# Patient Record
Sex: FEMALE | Race: WHITE | Employment: FULL TIME | URBAN - METROPOLITAN AREA
[De-identification: names, ages, dates, MRNs, and addresses within clinical notes are randomized per-mention and may not be internally consistent; named-entity substitution may affect disease eponyms.]

---

## 2019-02-04 ENCOUNTER — HOSPITAL ENCOUNTER (OUTPATIENT)
Dept: DIABETES SERVICES | Age: 59
Discharge: HOME | End: 2019-02-04
Payer: COMMERCIAL

## 2019-02-04 DIAGNOSIS — E10.9 TYPE 1 DIABETES MELLITUS WITHOUT COMPLICATION (HCC): ICD-10-CM

## 2019-02-04 PROCEDURE — G0108 DIAB MANAGE TRN  PER INDIV: HCPCS | Performed by: DIETITIAN, REGISTERED

## 2020-01-27 ENCOUNTER — HOSPITAL ENCOUNTER (OUTPATIENT)
Dept: GENERAL RADIOLOGY | Age: 60
Discharge: HOME | End: 2020-01-27
Attending: INTERNAL MEDICINE
Payer: COMMERCIAL

## 2020-01-27 DIAGNOSIS — R05.9 COUGH: ICD-10-CM

## 2020-01-27 PROCEDURE — 71046 X-RAY EXAM CHEST 2 VIEWS: CPT

## 2020-04-22 ENCOUNTER — VIRTUAL VISIT (OUTPATIENT)
Dept: ENDOCRINOLOGY | Age: 60
End: 2020-04-22

## 2020-04-22 ENCOUNTER — TELEPHONE (OUTPATIENT)
Dept: ENDOCRINOLOGY | Age: 60
End: 2020-04-22

## 2020-04-22 DIAGNOSIS — E78.2 MIXED HYPERLIPIDEMIA: ICD-10-CM

## 2020-04-22 DIAGNOSIS — E11.21 TYPE 2 DIABETES MELLITUS WITH DIABETIC NEPHROPATHY, WITH LONG-TERM CURRENT USE OF INSULIN (HCC): Primary | ICD-10-CM

## 2020-04-22 DIAGNOSIS — Z79.4 TYPE 2 DIABETES MELLITUS WITH DIABETIC NEPHROPATHY, WITH LONG-TERM CURRENT USE OF INSULIN (HCC): Primary | ICD-10-CM

## 2020-04-22 RX ORDER — SODIUM ZIRCONIUM CYCLOSILICATE 5 G/5G
5 POWDER, FOR SUSPENSION ORAL
COMMUNITY
End: 2020-08-28

## 2020-04-22 RX ORDER — DILTIAZEM HYDROCHLORIDE 120 MG/1
CAPSULE, EXTENDED RELEASE ORAL DAILY
COMMUNITY
Start: 2020-04-17 | End: 2020-08-28

## 2020-04-22 RX ORDER — INSULIN ASPART INJECTION 100 [IU]/ML
INJECTION, SOLUTION SUBCUTANEOUS
COMMUNITY
Start: 2020-04-17 | End: 2020-06-29 | Stop reason: SDUPTHER

## 2020-04-22 RX ORDER — ATORVASTATIN CALCIUM 20 MG/1
TABLET, FILM COATED ORAL DAILY
COMMUNITY
Start: 2020-04-17 | End: 2021-11-08 | Stop reason: SDUPTHER

## 2020-04-22 RX ORDER — VALACYCLOVIR HYDROCHLORIDE 500 MG/1
500 TABLET, FILM COATED ORAL DAILY
COMMUNITY
Start: 2020-04-17

## 2020-04-22 NOTE — PROGRESS NOTES
Chief Complaint   Patient presents with    Diabetes     pcp and pharmacy verified            **THIS IS A VIRTUAL VISIT VIA A VIDEO ENCOUNTER. PATIENT AGREED TO HAVE THEIR CARE DELIVERED OVER VIDEO IN PLACE OF THEIR REGULARLY SCHEDULED OFFICE VISIT**      History of Present Illness: Roney Cazares is a 61 y.o. female who I was asked to see in consult by Dr. Rita Segura for evaluation of DM. Will request records from Dr. Antonio Rosen. She was diagnosed with diabetes with her first pregnancy and then \"it went away\". She had GDM with her second pregnancy and it continued after that. She is currently using an OMNI pod (which she has been on for 2 years) as well as the DexCom G6. She does upload her CGM data to the [facility]. Her current Pump settings  MN 0.6  4AM 0.45  9AM 0.5  11AM 0.55  8PM 0.6 units  Carb Ratio  1:10  Correction Factor  1:30    Pt reports that she is tending to run high in the evening and is having lower sugars in the morning. She notes that she is very active and she has been cutting off the omnipod before she works out, goes walking or plays tennis. She notes that her BGs are running high around 2-3AM (300's range), but if she corrects this her BGs will drop to low by 5AM.  If she does not correct then her morning BG will be around 170-200, then she goes for a run and it will drop. She goes for her AM run around 930-10AM. She was going to the gym, but she can no longer go there. Her run will typically last for 30-45 minutes. She is only eating one meal per day and is eating a light lunch and will snack some during the day  Pt wakes around 830AM, she will not eat before she goes for her run. She will have a lunch around 11AM-Noon. If she is playing tennis she will keep an apple with her and watch her BGs and eat if she gets low. This afternoon she had some leftover chicken and diet soda.   She has dinner around 730-8PM, last night she had fish, spinach, salad and wine.    Exercise consists of running, playing tennis. No history of vascular disease. Pt has nephropathy (Urine MA/Cr 890 in December 2019) as well as CKD III (eGFR 40's). No history of neuropathy. Last eye exam was March 2020, she sees him every 6 weeks. She has swelling in her retina and is receiving injections for this. She reports that she has never been told she has retinopathy, bleeding in her retina or \"diabetes in her eyes\". Past Medical History:   Diagnosis Date    Diabetes [facility])      History reviewed. No pertinent surgical history. Current Outpatient Medications   Medication Sig    dilTIAZem (TIAZAC) 120 mg SR capsule daily.  Fiasp U-100 Insulin 100 unit/mL soln USES IN PUMP    atorvastatin (LIPITOR) 20 mg tablet daily.  valACYclovir (VALTREX) 500 mg tablet 500 mg daily.  sodium zirconium cyclosilicate (Lokelma) 5 gram powder packet Take 5 g by mouth. 1 packet every other day     No current facility-administered medications for this visit.       No Known Allergies  Family History   Problem Relation Age of Onset    Cancer Mother         Brain Tumor    Diabetes Father     Heart Disease Father     No Known Problems Sister     Cancer Sister         Brain tumor at 11     Social History     Socioeconomic History    Marital status:      Spouse name: Not on file    Number of children: Not on file    Years of education: Not on file    Highest education level: Not on file   Occupational History    Not on file   Social Needs    Financial resource strain: Not on file    Food insecurity     Worry: Not on file     Inability: Not on file   [employer] needs     Medical: Not on file     Non-medical: Not on file   Tobacco Use    Smoking status: Never Smoker    Smokeless tobacco: Never Used   Substance and Sexual Activity    Alcohol use: Yes     Comment: socially    Drug use: Never    Sexual activity: Not on file   Lifestyle    Physical activity     Days per week: Not on file     Minutes per session: Not on file    Stress: Not on file   Relationships    Social connections     Talks on phone: Not on file     Gets together: Not on file     Attends Judaism service: Not on file     Active member of club or organization: Not on file     Attends meetings of clubs or organizations: Not on file     Relationship status: Not on file    Intimate partner violence     Fear of current or ex partner: Not on file     Emotionally abused: Not on file     Physically abused: Not on file     Forced sexual activity: Not on file   Other Topics Concern    Not on file   Social History Narrative    Not on file     Review of Systems:  - Constitutional Symptoms: no fevers, chills, weight loss  - Eyes: no blurry vision or double vision  - Cardiovascular: no chest pain or palpitations  - Respiratory: no cough or shortness of breath  - Gastrointestinal: no dysphagia or abdominal pain  - Musculoskeletal: no joint pains or weakness  - Integumentary: no rashes  - Neurological: no numbness, tingling, or headaches  - Psychiatric: no depression or anxiety  - Endocrine: no heat or cold intolerance, no polyuria or polydipsia    Physical Examination:  There were no vitals taken for this visit.   - GENERAL: NCAT, Appears well nourished   - EYES: EOMI, non-icteric, no proptosis   - Ear/Nose/Throat: NCAT, no visible inflammation or masses   - CARDIOVASCULAR: no cyanosis, no visible JVD   - RESPIRATORY: respiratory effort normal without any distress or labored breathing   - MUSCULOSKELETAL: Normal ROM of neck and upper extremities observed   - SKIN: No rash on face   - NEUROLOGIC:  No facial asymmetry (Cranial nerve 7 motor function), No gaze palsy   - PSYCHIATRIC: Normal affect, Normal insight and judgement   -     Data Reviewed:        Date Name Specimen Result Interpretation Description Value Range Status Address     12/05/2019 CMP, Serum or Plasma   Above High Normal   Glucose 221 mg/dL 65-99 mg/dL Final [facility] ([place]): [address] [facility] [place]          Above High Normal   Bun 32 mg/dL 6-24 mg/dL Final [facility] ([place]): [address] [facility] [place]          Above High Normal   Creatinine 1.38 mg/dL 0.57-1.00 mg/dL Final [facility] ([place]): [address] [facility] [place]          Below Low Normal   eGFR If Nonafricn AM 42 mL/min/1.73 >59 mL/min/1.73 Final [facility] ([place]): [address] [facility] [place]          Below Low Normal   eGFR If Africn AM 48 mL/min/1.73 >59 mL/min/1.73 Final [facility] ([place]): [address] [facility] [place]              BUN/creatinine Ratio 23 9-23 Final [facility] ([place]): [address] [facility] [place]              Sodium 141 mmol/L 134-144 mmol/L Final [facility] ([place]): [address] [facility] [place]              Potassium 5.2 mmol/L 3.5-5.2 mmol/L Final [facility] ([place]): [address] [facility] [place]              Chloride 102 mmol/L  mmol/L Final [facility] ([place]): [address] [facility] [place]              Carbon Dioxide, Total 23 mmol/L 20-29 mmol/L Final [facility] ([place]): [address] [facility] [place]          Above High Normal   Calcium 10.3 mg/dL 8.7-10.2 mg/dL Final [facility] ([place]): [address] [facility] [place]              Protein, Total 7.1 g/dL 6.0-8.5 g/dL Final [facility] ([place]): [address] [facility] [place]              Albumin 4.4 g/dL 3.5-5.5 g/dL Final [facility] ([place]): [address] [facility] [place]              Globulin, Total 2.7 g/dL 1.5-4.5 g/dL Final [facility] ([place]): [address] [facility] [place]              A/g Ratio 1.6 1.2-2.2 Final [facility] ([place]): [address] [facility]              Bilirubin, Total <0.2 mg/dL 0.0-1.2 mg/dL Final [facility] ([place]): [address] [facility] [place]              Alkaline Phosphatase 64 IU/L  IU/L Final [facility] ([place]): [address] [facility] [place]              Ast (Sgot) 20 IU/L 0-40 IU/L Final [facility] ([place]): [address] [facility] [place]              Alt (Sgpt) 15 IU/L 0-32 IU/L Final [facility] ([place]): [address] [facility] [place]    12/05/2019 Lipid Panel, Serum   Above High Normal   Cholesterol, Total 271 mg/dL 100-199 mg/dL Final [facility] ([place]): [address] [facility]              Triglycerides 65 mg/dL 0-149 mg/dL Final [facility] ([place]): [address]              HDL Cholesterol 138 mg/dL >39 mg/dL Final [facility] ([place]): [address] [facility]              VLDL Cholesterol [place] 13 mg/dL 5-40 mg/dL Final [facility] ([place]): [address] [facility]          Above High Normal   LDL Cholesterol Calc 120 mg/dL 0-99 mg/dL Final [facility] ([place]): [address] [facility]              LDL/HDL Ratio 0.9 ratio 0.0-3.2 ratio Final [facility] ([place]): [address]    12/05/2019 Microalbumin/creatinine, Mass Ratio, Urine       Creatinine, Urine 228.9 mg/dL not estab. mg/dL Final [facility] ([place]): [address] [facility]              Albumin, Urine 2038.0 ug/mL not estab. ug/mL Final [facility] ([place]): [address] [facility]          Above High Normal   Alb/creat Ratio 890.3 mg/g creat 0.0-30.0 mg/g creat Final [facility] ([place]): [address] [facility]    12/05/2019 HbA1C (Hemoglobin a1C), Blood   Above High Normal   Hemoglobin a1C 10.1 % 4.8-5.6 % Final [facility] ([place]): [address] [facility]    12/05/2019 TSH W/reflex       Tsh 1.490 uIU/mL 0.450-4.500 uIU/mL Final [facility] ([facility]): [address]          Assessment/Plan:   1. Type 2 diabetes mellitus with diabetic nephropathy, with long-term current use of insulin ([address]    2. Mixed hyperlipidemia    1) Pt is reporting that she has high BG spikes at 2-3AM and if she does nothing it will improve to 170-200's range by the time she wakes around 8AM, but when she goes for a run or plays tennis in the morning her BGs will drop.   We adjusted her Basal rate on the OMNI Pod to the following  MN 0.6 > 0.7  4AM 0.45  9AM 0.5 > 0.45  11AM 0.55  8PM 0.6 units    Pt to share her DexCom clarity data with me and we can monitor her BGs to see if her numbers are improving and if not, we can adjust her pump setting further. Will order an A1C.    2) In December 2019 her TC and LDL were above goal, she is now taking Atorvastatin 20mg daily. We can repeat her lipid panel before our next visit to see if this dose needs to be adjusted. Pt voices understanding and agreement with the plan.     RTC 3 months          Copy sent to:  Dr. Kenneth Villalta

## 2020-04-22 NOTE — TELEPHONE ENCOUNTER
----- Message from Sara Morales sent at 4/21/2020  4:42 PM EDT -----  Regarding: Dr. Jagdish Joe  Patient return call    Caller's first and last name and relationship (if not the patient):      Best contact number(s):  [phone]      Whose call is being returned: [facility]      Details to clarify the request: Returning call to reschedule virtual appointment on 4/22, no appointments available    Sara Morales

## 2020-06-09 LAB
CREATININE, EXTERNAL: 1.22
HBA1C MFR BLD HPLC: 9.3 %
LDL-C, EXTERNAL: 106

## 2020-07-21 ENCOUNTER — DOCUMENTATION ONLY (OUTPATIENT)
Dept: ENDOCRINOLOGY | Age: 60
End: 2020-07-21

## 2020-07-24 ENCOUNTER — TELEPHONE (OUTPATIENT)
Dept: ENDOCRINOLOGY | Age: 60
End: 2020-07-24

## 2020-07-24 NOTE — TELEPHONE ENCOUNTER
----- Message from Kyle Galeana sent at 7/24/2020  3:46 PM EDT -----  Regarding: Dr Hanks Getting  General Message/Vendor Calls    Caller's first and last name: Prema/Rajani BC/BS      Reason for call: Krista Alcantar is requesting clinical information to support the excessive qty which is above the quantity limits allowed    (F) [phone]    Callback required yes/no and why: yes      Best contact number(s):[phone]      Details to clarify the request:      Kyle Galeana

## 2020-08-27 LAB
EST. AVERAGE GLUCOSE BLD GHB EST-MCNC: 226 MG/DL
HBA1C MFR BLD: 9.5 % (ref 4.8–5.6)

## 2020-08-28 ENCOUNTER — VIRTUAL VISIT (OUTPATIENT)
Dept: ENDOCRINOLOGY | Age: 60
End: 2020-08-28
Payer: COMMERCIAL

## 2020-08-28 DIAGNOSIS — E11.21 TYPE 2 DIABETES MELLITUS WITH DIABETIC NEPHROPATHY, WITH LONG-TERM CURRENT USE OF INSULIN (HCC): Primary | ICD-10-CM

## 2020-08-28 DIAGNOSIS — E78.2 MIXED HYPERLIPIDEMIA: ICD-10-CM

## 2020-08-28 DIAGNOSIS — Z79.4 TYPE 2 DIABETES MELLITUS WITH DIABETIC NEPHROPATHY, WITH LONG-TERM CURRENT USE OF INSULIN (HCC): Primary | ICD-10-CM

## 2020-08-28 PROCEDURE — 99214 OFFICE O/P EST MOD 30 MIN: CPT | Performed by: INTERNAL MEDICINE

## 2020-08-28 RX ORDER — SULFAMETHOXAZOLE AND TRIMETHOPRIM 800; 160 MG/1; MG/1
TABLET ORAL
COMMUNITY
End: 2020-08-28 | Stop reason: ALTCHOICE

## 2020-08-28 RX ORDER — SODIUM POLYSTYRENE SULFONATE 15 G/60ML
SUSPENSION ORAL; RECTAL
COMMUNITY
Start: 2020-06-20 | End: 2020-08-28

## 2020-08-28 RX ORDER — PATIROMER 16.8 G/1
POWDER, FOR SUSPENSION ORAL EVERY OTHER DAY
COMMUNITY
End: 2021-07-14

## 2020-08-28 NOTE — PROGRESS NOTES
Lab Results   Component Value Date/Time    Hemoglobin A1c 9.5 (H) 08/26/2020 11:37 AM    Hemoglobin A1c, External 9.3 06/09/2020    Hemoglobin A1c, External 10.1 12/06/2019

## 2020-08-28 NOTE — PROGRESS NOTES
Chief Complaint   Patient presents with    Diabetes     Doxy: [id number]     Other     Pharmacy: [facility]            **THIS IS A VIRTUAL VISIT VIA A VIDEO ENCOUNTER. PATIENT AGREED TO HAVE THEIR CARE DELIVERED OVER VIDEO IN PLACE OF THEIR REGULARLY SCHEDULED OFFICE VISIT**      History of Present Illness: Khanh Blanc is a 61 y.o. female here for follow up of diabetes. She was diagnosed with diabetes with her first pregnancy and then \"it went away\". She had GDM with her second pregnancy and it continued after that. She is currently using an OMNI pod (which she has been on for 2-3 years) as well as the DexCom G6. She does upload her CGM data to the [facility]. \"My sugars have been better since we made the changes last time, but I am still having high blood sugars overnight and in the morning. She uses the DexCom G6 to check her BGs  6-8 times per day. She notes her BGs overnight have been running in the 200-300. She note they seem to increase overnight and then start to come back down around 6AM.  \"I am active during the day and my sugars are good during the day, but around 11PM, they start to run higher\". Pt notes that if she gives herself a correction bolus in the morning she has has BGs drop as low as 40. She denies any recent illnesses, injuries or hospitalizations. Her current Pump settings  MN-4AM 0.7  4AM-11AM 0.45  11AM-8PM 0.55  8PM-MN 0.6 units  Carb Ratio  1:10  Correction Factor  1:30    Pt notes her largest meal of the day is at 8AM.  She notes dinner is her only \"meal\" during the day. She notes that she will snack or fruit on occasions during the day. Pt notes that she runs, plays tennis and will swim to stay active and busy. No history of vascular disease. Pt has nephropathy (Urine MA/Cr 890 in December 2019) as well as CKD III (eGFR 40's). No history of neuropathy. Last eye exam was March 2020, she sees him every 6 weeks.  She has swelling in her retina and is receiving injections for this. She reports that she has never been told she has retinopathy, bleeding in her retina or \"diabetes in her eyes\". Pt monitors her BPs at home and she notes that \"are running in a good range, they have never been high\". Current Outpatient Medications   Medication Sig    patiromer calcium sorbitex (Veltassa) 16.8 gram powder Veltassa 16.8 gram oral powder packet    Fiasp U-100 Insulin 100 unit/mL soln USES IN PUMP 50 units per day max    atorvastatin (LIPITOR) 20 mg tablet daily.  valACYclovir (VALTREX) 500 mg tablet 500 mg daily. No current facility-administered medications for this visit. No Known Allergies  Review of Systems:  - Eyes: no blurry vision or double vision  - Cardiovascular: no chest pain  - Respiratory: no shortness of breath  - Musculoskeletal: no myalgias  - Neurological: no numbness/tingling in extremities    Physical Examination:  There were no vitals taken for this visit. - GENERAL: NCAT, Appears well nourished   - EYES: EOMI, non-icteric, no proptosis   - Ear/Nose/Throat: NCAT, no visible inflammation or masses   - CARDIOVASCULAR: no cyanosis, no visible JVD   - RESPIRATORY: respiratory effort normal without any distress or labored breathing   - MUSCULOSKELETAL: Normal ROM of neck and upper extremities observed   - SKIN: No rash on face   - NEUROLOGIC:  No facial asymmetry (Cranial nerve 7 motor function), No gaze palsy   - PSYCHIATRIC: Normal affect, Normal insight and judgement   -     Data Reviewed:   Component      Latest Ref Rng & Units 6/9/2020 6/9/2020 6/9/2020          12:00 AM 12:00 AM 12:00 AM   LDL-C, External         106   Creatinine, External        1.22    Hemoglobin A1c, External       9.3         Assessment/Plan:   1) DM > Her A1C in June was 9.3%. She is still having high BGs overnight, but she notes her BGs during the day have been much improved. Will make the following changes to her pump setting.     Her current Pump settings  MN-4AM 0.7 > 0.8  4AM-11AM 0.45  11AM-8PM 0.55  8PM-MN 0.6 >0.7  Carb Ratio  1:10  Correction Factor  1:30    Will review her BG readings in 2 weeks and make further adjustments as needed. 2) HLD > Her LDL was 106 in June 2020. Pt is taking the Atorvastatin 20mg daily, which she is tolerating well. Will not make any changes at this time. Pt voices understanding and agreement with the plan. I spent 25 minutes of total time during this virtual visit with a tele-video encounter. All questions were answered and a copy of the instructions were sent to her via [employer]/a letter.        RTC 3 months    Copy sent to:  Dr. Ronel Mart

## 2020-09-22 ENCOUNTER — TELEPHONE (OUTPATIENT)
Dept: ENDOCRINOLOGY | Age: 60
End: 2020-09-22

## 2020-09-22 NOTE — TELEPHONE ENCOUNTER
Called patient. Left a message on her VM (name verified) that there is an unread MyChart message from Roshni Cm. Read the message regarding pump settings to VM. Advised to call to confirm receipt of message.

## 2020-10-01 ENCOUNTER — TELEPHONE (OUTPATIENT)
Dept: ENDOCRINOLOGY | Age: 60
End: 2020-10-01

## 2020-10-01 NOTE — TELEPHONE ENCOUNTER
----- Message from Yuliet Flores sent at 10/1/2020  4:08 PM EDT -----  Regarding: Dr Tanesha Laird  Patient return call    Caller's first and last name and relationship (if not the patient):      Best contact number(s): [phone]      Whose call is being returned: [employer]      Details to clarify the request: pt has received the VM and has made the changes as recommended         Yuliet Flores

## 2020-10-20 ENCOUNTER — TELEPHONE (OUTPATIENT)
Dept: ENDOCRINOLOGY | Age: 60
End: 2020-10-20

## 2020-10-20 NOTE — TELEPHONE ENCOUNTER
Spoke with patient. She states that she did see the notification that she received a AirClic message, but was not sure if she looked in the correct place to retrieve the message. When she gets home, she will open [employer] and try to see the messages. (Advised has 2 unread messages). Requested that patient write a [employer] message to make sure that we can correspond via AirClic. Left a VM with the instructions from 10/16/2020, but patient states that she is having some lows in the mornings. She states that when she sleeps, she must roll over onto the pump, causing the site to get sore and \"infected\", then the pump does not work. She wonders if there is some other pump or if she can do something differently with the pump at bedtime.

## 2020-10-20 NOTE — TELEPHONE ENCOUNTER
You 3 minutes ago (10:10 AM)          Spoke with patient. She states that she did see the notification that she received a [employer] message, but was not sure if she looked in the correct place to retrieve the message. When she gets home, she will open [employer] and try to see the messages. (Advised has 2 unread messages). Requested that patient write a [employer] message to make sure that we can correspond via [employer]. Left a VM, per patient request,  with the instructions from 10/16/2020, but patient states that she is having some lows in the mornings. She states that when she sleeps, she must roll over onto the pump, causing the site to get sore and \"infected\", then the pump does not work. She wonders if there is some other pump or if she can do something differently with the pump at bedtime. Will send a [employer] message to patient asking her to upload blood sugar readings to [facility] for Dexcom for  to see.

## 2020-11-17 ENCOUNTER — PATIENT MESSAGE (OUTPATIENT)
Dept: ENDOCRINOLOGY | Age: 60
End: 2020-11-17

## 2020-11-19 RX ORDER — INSULIN ASPART INJECTION 100 [IU]/ML
INJECTION, SOLUTION SUBCUTANEOUS
Qty: 50 ML | Refills: 3 | Status: SHIPPED | OUTPATIENT
Start: 2020-11-19 | End: 2021-11-08 | Stop reason: SDUPTHER

## 2020-12-08 ENCOUNTER — VIRTUAL VISIT (OUTPATIENT)
Dept: ENDOCRINOLOGY | Age: 60
End: 2020-12-08
Payer: COMMERCIAL

## 2020-12-08 DIAGNOSIS — E11.21 TYPE 2 DIABETES MELLITUS WITH DIABETIC NEPHROPATHY, WITH LONG-TERM CURRENT USE OF INSULIN (HCC): Primary | ICD-10-CM

## 2020-12-08 DIAGNOSIS — E78.2 MIXED HYPERLIPIDEMIA: ICD-10-CM

## 2020-12-08 DIAGNOSIS — Z79.4 TYPE 2 DIABETES MELLITUS WITH DIABETIC NEPHROPATHY, WITH LONG-TERM CURRENT USE OF INSULIN (HCC): Primary | ICD-10-CM

## 2020-12-08 PROCEDURE — 99214 OFFICE O/P EST MOD 30 MIN: CPT | Performed by: INTERNAL MEDICINE

## 2020-12-08 NOTE — PROGRESS NOTES
Chief Complaint   Patient presents with    Diabetes     pcp and pharmacy verified. DOXY. ME            **THIS IS A VIRTUAL VISIT VIA A VIDEO ENCOUNTER. PATIENT AGREED TO HAVE THEIR CARE DELIVERED OVER VIDEO IN PLACE OF THEIR REGULARLY SCHEDULED OFFICE VISIT**      History of Present Illness: Bean Major is a 61 y.o. female here for follow up of diabetes. She was diagnosed with diabetes with her first pregnancy and then \"it went away\". She had GDM with her second pregnancy and it continued after that. She is currently using an OMNI pod (which she has been on for 2-3 years) as well as the DexCom G6. She does upload her CGM data to the [facility]. Her most recent BG logs show an average BG of 219. Her sugars tend to trend up to the 300s between 8PM-MN, then starting around 2-3AM will stert the decline and then remain stable in the 180s range till noon and will increase after lunch and then start to improve around 1-2PM.    Pt notes she is not bolusing enough for her dinner, which is why she feels her BGs are increasing overnight. She is very hesitant to make any changes on her basal rate because of fears of low BGs overnight. She uses the DexCom G6 to check her BGs  6-8 times per day. \"I am active during the day and my sugars are good during the day, but around 11PM, they start to run higher\". Pt notes that if she gives herself a correction bolus in the morning she has has BGs drop as low as 40. She denies any recent illnesses, injuries or hospitalizations. Her current Pump settings  MN-4AM 0.9  4AM-11AM 0.35  11AM-8PM 0.55  8PM-MN 0.9   Carb Ratio  1:10  Correction Factor  1:30    Pt she is not eating much during the day \"I will have a small breakfast, a piece of fruit in the afternoon and then dinner\". She will wake up and go for a run or exercise.   By 10AM she will have a piece of fruit and then by 1-2PM she will have a piece of fruit and have dinner around 730PM, last night she had a bowl of chick-Pea soup and salad. She will have a bowl of ice cream around 830-9PM    Pt notes that she runs, plays tennis and will swim to stay active and busy. No history of vascular disease. Pt has nephropathy (Urine MA/Cr 890 in December 2019) as well as CKD III (eGFR 40's). No history of neuropathy. Last eye exam was November 2020, she sees him every 6-8 weeks. She has swelling in her retina and is receiving injections for this. She reports that she has never been told she has retinopathy, bleeding in her retina or \"diabetes in her eyes\". Pt monitors her BPs at home and she notes that \"are running in a good range, they have never been high\". Current Outpatient Medications   Medication Sig    Fiasp U-100 Insulin 100 unit/mL soln USES IN PUMP 50 units per day max    patiromer calcium sorbitex (Veltassa) 16.8 gram powder every other day.  atorvastatin (LIPITOR) 20 mg tablet daily.  valACYclovir (VALTREX) 500 mg tablet 500 mg daily. No current facility-administered medications for this visit. No Known Allergies  Review of Systems:  - Eyes: no blurry vision or double vision  - Cardiovascular: no chest pain  - Respiratory: no shortness of breath  - Musculoskeletal: no myalgias  - Neurological: no numbness/tingling in extremities    Physical Examination:  There were no vitals taken for this visit.   - GENERAL: NCAT, Appears well nourished   - EYES: EOMI, non-icteric, no proptosis   - Ear/Nose/Throat: NCAT, no visible inflammation or masses   - CARDIOVASCULAR: no cyanosis, no visible JVD   - RESPIRATORY: respiratory effort normal without any distress or labored breathing   - MUSCULOSKELETAL: Normal ROM of neck and upper extremities observed   - SKIN: No rash on face   - NEUROLOGIC:  No facial asymmetry (Cranial nerve 7 motor function), No gaze palsy   - PSYCHIATRIC: Normal affect, Normal insight and judgement   -     Data Reviewed:   None    Assessment/Plan:   1) DM > She is still having high BGs overnight, but she notes her BGs during the day have been much improved. Will make the following changes to her pump setting. Her current Pump settings  MN-4AM 0.9  4AM-11AM 0.35  11AM-8PM 0.55  8PM-MN 0.9 > 1.0 units per hour  Carb Ratio  1:10  Correction Factor  1:30    Because she has had extreme variations (40-400s) and hypoglycemia, she will benefit from the upgraded DASH system to help monitor her BGs and be able to correct the basal for variations. Will review her BG readings in 2 weeks and make further adjustments as needed. 2) HLD > Her LDL was 106 in June 2020. Pt is taking the Atorvastatin 20mg daily, which she is tolerating well. Will not make any changes at this time. Pt voices understanding and agreement with the plan.       RTC 4 months    Copy sent to:  Dr. Valorie Webb

## 2020-12-23 LAB — LDL-C, EXTERNAL: 117

## 2020-12-28 LAB — CREATININE, EXTERNAL: 0.96

## 2020-12-29 LAB — HBA1C MFR BLD HPLC: 9.1 %

## 2021-04-06 ENCOUNTER — OFFICE VISIT (OUTPATIENT)
Dept: ENDOCRINOLOGY | Age: 61
End: 2021-04-06
Payer: COMMERCIAL

## 2021-04-06 VITALS
RESPIRATION RATE: 14 BRPM | SYSTOLIC BLOOD PRESSURE: 140 MMHG | WEIGHT: 131 LBS | DIASTOLIC BLOOD PRESSURE: 85 MMHG | BODY MASS INDEX: 23.96 KG/M2 | HEART RATE: 68 BPM

## 2021-04-06 DIAGNOSIS — E11.21 TYPE 2 DIABETES MELLITUS WITH DIABETIC NEPHROPATHY, WITH LONG-TERM CURRENT USE OF INSULIN (HCC): Primary | ICD-10-CM

## 2021-04-06 DIAGNOSIS — E78.2 MIXED HYPERLIPIDEMIA: ICD-10-CM

## 2021-04-06 DIAGNOSIS — Z79.4 TYPE 2 DIABETES MELLITUS WITH DIABETIC NEPHROPATHY, WITH LONG-TERM CURRENT USE OF INSULIN (HCC): Primary | ICD-10-CM

## 2021-04-06 PROCEDURE — 99214 OFFICE O/P EST MOD 30 MIN: CPT | Performed by: INTERNAL MEDICINE

## 2021-04-06 RX ORDER — OFLOXACIN 3 MG/ML
SOLUTION/ DROPS OPHTHALMIC
COMMUNITY
Start: 2021-04-05 | End: 2021-04-06

## 2021-04-06 RX ORDER — PREDNISOLONE ACETATE 10 MG/ML
SUSPENSION/ DROPS OPHTHALMIC
COMMUNITY
Start: 2021-03-03 | End: 2021-07-14

## 2021-04-06 RX ORDER — KETOROLAC TROMETHAMINE 5 MG/ML
SOLUTION OPHTHALMIC
COMMUNITY
Start: 2021-03-18 | End: 2021-04-06

## 2021-04-06 NOTE — PROGRESS NOTES
Chief Complaint   Patient presents with    Diabetes     pcp and pharmacy verified. see retinal specialist. IN PERSON              History of Present Illness: Kayode Otero is a 61 y.o. female here for follow up of diabetes. She was diagnosed with diabetes with her first pregnancy and then \"it went away\". She had GDM with her second pregnancy and it continued after that. \"They sent me a new meter and walked me through the set-up but I don't now if I did it correctly. If I hit my pump wrong the pump will stop working and when my sugars start running very high that notifies me and I have to change my pump site. \"    She is currently using an OMNI pod as well as the DexCom G6. She does upload her CGM data to the [facility]. Her BGs start increasing around 9PM, are high all night and then she notes that she will get up and get active in the morning and her BGs will improve. Pt notes that if she boluses to cover a high BG overnight her sugars are prone to drop. She uses the DexCom G6 to check her BGs  6-8 times per day. \"I am active during the day and my sugars are good during the day, but around 11PM, they start to run higher\". Pt notes that if she gives herself a correction bolus in the morning she has has BGs drop as low as 40. She denies any recent illnesses, injuries or hospitalizations. Her current Pump settings  MN-4AM 0.35  4AM-8PM 0.55  8PM-MN 0.90   Carb Ratio  1:15  Correction Factor  1:50    Pt notes her main meal is dinner. Pt she is not eating much during the day \"I will have a small breakfast, a piece of fruit in the afternoon and then dinner\". She will wake up around 8-830AM.  By 10AM she will have a piece of fruit and then by 1-2PM she will have a piece of fruit or a salad and have dinner around 730PM, last night she had lamb, green beans, salad and wine.    She will have a bowl of ice cream around 830-9PM    Pt notes that she runs, plays tennis and will swim to stay active and busy.    No history of vascular disease. Pt has nephropathy (Urine MA/Cr 890 in December 2019) as well as CKD III (eGFR 40's). No history of neuropathy. Last eye exam was March 2021. She had cataract surgery 2 weeks ago and has her second eye later this week. 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 She notes that \"they are helping\". Current Outpatient Medications   Medication Sig    Fiasp U-100 Insulin 100 unit/mL soln USES IN PUMP 50 units per day max    patiromer calcium sorbitex (Veltassa) 16.8 gram powder every other day.  atorvastatin (LIPITOR) 20 mg tablet daily.  valACYclovir (VALTREX) 500 mg tablet 500 mg daily.  prednisoLONE acetate (PRED FORTE) 1 % ophthalmic suspension      No current facility-administered medications for this visit. No Known Allergies  Review of Systems:  - Eyes: no blurry vision or double vision  - Cardiovascular: no chest pain  - Respiratory: no shortness of breath  - Musculoskeletal: no myalgias  - Neurological: no numbness/tingling in extremities    Physical Examination:  Blood pressure (!) 140/85, pulse 68, resp. rate 14, weight 131 lb (59.4 kg).   General: pleasant, no distress, good eye contact   Neck: no carotid bruits  Cardiovascular: regular, normal rate, nl s1 and s2, no m/r/g, 2+ DP pulses   Respiratory: clear bilaterally  Integumentary: no edema, no foot ulcers  Psychiatric: normal mood and affect    Diabetic foot exam:      Left Foot:              Visual Exam: normal               Pulse DP: 2+ (normal)              Filament test: normal sensation               Vibratory sensation: normal                 Right Foot:              Visual Exam: normal               Pulse DP: 2+ (normal)              Filament test: normal sensation               Vibratory sensation: normal       Data Reviewed:   Component      Latest Ref Rng & Units 1/9/2021 12/29/2020 12/28/2020 12/23/2020          12:00 AM 12:00 AM 12:00 AM 12:00 AM LDL-C, External          117   Creatinine, External         0.96    Hemoglobin A1c, External      %  9.1     Urine Microalbumin, External       695.1          Assessment/Plan:   1) DM > She is still having high BGs overnight, but if she boluses for the high overnight she is prone to low BGs. Will adjust her pump setting to control the overnight high and change her correction factor from 1:50 to 1:65. she notes her BGs during the day have been much improved. Will make the following changes to her pump setting. MN-6AM 0.50  6AM-[id number] 0.35  11AM-8PM 0.55  8PM-MN 1.0 units per hour  Carb Ratio  1:15  Correction Factor  1:65    Because she has had extreme variations (40-400s) and hypoglycemia, she will benefit from the upgraded DASH system to help monitor her BGs and be able to correct the basal for variations. Will review her BG readings in 2 weeks and make further adjustments as needed. 2) HLD > Her LDL was 106 in June 2020. Pt is taking the Atorvastatin 20mg daily, which she is tolerating well. Will not make any changes at this time. Pt voices understanding and agreement with the plan.       RTC 3 months    Copy sent to:  Dr. Mike Blanc

## 2021-06-03 ENCOUNTER — TELEPHONE (OUTPATIENT)
Dept: ENDOCRINOLOGY | Age: 61
End: 2021-06-03

## 2021-06-03 NOTE — TELEPHONE ENCOUNTER
----- Message from Marc Laguerre MD sent at 2021  7:16 AM EDT -----  [address] Morning Ms Sidney Godinez,     I have been reviewing your DexCom readings and they seem to be running very high pretty much all day. Have you recovered well from your cataract surgery? Have you recently had any illnesses or sickness? Is your insulin pump settings the same?      MN-6AM 0.75   6AM-[id number] 0.35   11AM-8PM 0.55   8PM-MN 1.1 units per hour         Marc Laguerre MD

## 2021-06-03 NOTE — TELEPHONE ENCOUNTER
----- Message from Nia Valladares MD sent at 2021  7:16 AM EDT -----  [address] Morning Ms Lucia Myke,     I have been reviewing your DexCom readings and they seem to be running very high pretty much all day. Have you recovered well from your cataract surgery? Have you recently had any illnesses or sickness? Is your insulin pump settings the same?      MN-6AM 0.75   6AM-[id number] 0.35   11AM-8PM 0.55   8PM-MN 1.1 units per hour         Nia Valladares MD

## 2021-06-03 NOTE — TELEPHONE ENCOUNTER
I attempted to call Ms. Mary Diallo and reached her voice mail. I left a message regarding the unread [employer] message asking her to read it and give us a call if she had any questions.   Johnny Dahl

## 2021-07-06 DIAGNOSIS — E11.21 TYPE 2 DIABETES MELLITUS WITH DIABETIC NEPHROPATHY, WITH LONG-TERM CURRENT USE OF INSULIN (HCC): ICD-10-CM

## 2021-07-06 DIAGNOSIS — Z79.4 TYPE 2 DIABETES MELLITUS WITH DIABETIC NEPHROPATHY, WITH LONG-TERM CURRENT USE OF INSULIN (HCC): ICD-10-CM

## 2021-07-06 DIAGNOSIS — E78.2 MIXED HYPERLIPIDEMIA: ICD-10-CM

## 2021-07-14 ENCOUNTER — OFFICE VISIT (OUTPATIENT)
Dept: ENDOCRINOLOGY | Age: 61
End: 2021-07-14
Payer: COMMERCIAL

## 2021-07-14 VITALS
DIASTOLIC BLOOD PRESSURE: 61 MMHG | HEART RATE: 61 BPM | SYSTOLIC BLOOD PRESSURE: 132 MMHG | HEIGHT: 62 IN | WEIGHT: 128.4 LBS | BODY MASS INDEX: 23.63 KG/M2

## 2021-07-14 DIAGNOSIS — Z79.4 TYPE 2 DIABETES MELLITUS WITH DIABETIC NEPHROPATHY, WITH LONG-TERM CURRENT USE OF INSULIN (HCC): Primary | ICD-10-CM

## 2021-07-14 DIAGNOSIS — E11.21 TYPE 2 DIABETES MELLITUS WITH DIABETIC NEPHROPATHY, WITH LONG-TERM CURRENT USE OF INSULIN (HCC): Primary | ICD-10-CM

## 2021-07-14 DIAGNOSIS — E78.2 MIXED HYPERLIPIDEMIA: ICD-10-CM

## 2021-07-14 LAB — HBA1C MFR BLD HPLC: 9.6 %

## 2021-07-14 PROCEDURE — 99214 OFFICE O/P EST MOD 30 MIN: CPT | Performed by: INTERNAL MEDICINE

## 2021-07-14 PROCEDURE — 83036 HEMOGLOBIN GLYCOSYLATED A1C: CPT | Performed by: INTERNAL MEDICINE

## 2021-07-14 RX ORDER — PRENATAL VIT 91/IRON/FOLIC/DHA 28-975-200
COMBINATION PACKAGE (EA) ORAL 2 TIMES DAILY
COMMUNITY
End: 2021-11-08

## 2021-07-14 NOTE — LETTER
7/14/2021    Patient: Alexus Stokes   YOB: 1960   Date of Visit: 7/14/2021     Katherleen Ormond, [facility],[address], [place] [id number]  Via Fax: [phone]    Dear Katherleen Ormond, MD,      Thank you for referring Ms. Alexus Stokes to [facility] for evaluation. My notes for this consultation are attached. If you have questions, please do not hesitate to call me. I look forward to following your patient along with you.       Sincerely,    Cami John MD

## 2021-07-14 NOTE — LETTER
7/14/2021 11:38 AM    Ms. Juan A Garcia  [address] [place] [id number]      To Whom It May Concern:    Juan A Garcia is currently under the care of [address] [place] [address]. She is an insulin dependent diabetic on an insulin pump and Continuous Glucose Monitor. She has to keep all of her diabetic supplies, including her insulin, pump, pump infusion sets, CGM and sensors, at all time. If there are questions or concerns please have the patient contact our office.         Sincerely,      Natalie Henry MD

## 2021-07-14 NOTE — PROGRESS NOTES
Chief Complaint   Patient presents with    Diabetes     pcp and pharmacy verified              History of Present Illness: Kailyn Fritz is a 64 y.o. female here for follow up of diabetes. She was diagnosed with diabetes with her first pregnancy and then \"it went away\". She had GDM with her second pregnancy and it continued after that. Her A1C today was 9.6%. At our last visit in April 2021 I adjusted her OmniPod settings. MN-6AM 0.50  6AM-11AM 0.35  11AM-8PM 0.55  8PM-MN 1.0 units per hour  Carb Ratio  1:15  Correction Factor  1:65    Looking at her DexCom report her BGs begin to improve into the normal range around 6AM, are running in the 180s range till 6PM and then begin to increase into the 200s range after that and are elevated all night. Pt has not been showing evidence of hypoglycemia. \"My sugars are still running high in the evening, I need to be better about bolusing for dinner. Pt denies any recent illnesses, injuries or hospitalizations. Pt has received both COVID vaccinations (Carol Robles). \"We are going to [facility] next week, for two weeks, I have my supplies and sensors,     She is currently using an OMNI pod as well as the DexCom G6. She does upload her CGM data to the [facility]. Her BGs start increasing around 9PM, are high all night and then she notes that she will get up and get active in the morning and her BGs will improve. Pt notes that if she boluses to cover a high BG overnight her sugars are prone to drop. She uses the DexCom G6 to check her BGs  6-8 times per day. \"I am active during the day and my sugars are good during the day, but around 11PM, they start to run higher\". Pt notes that if she is no longer having the low BGs when she corrects for high BGS, since we changes her Correction to 1:65. Her current Pump settings  MN-5AM 0.50  5AM-8PM 0.55  8PM-MN 1.0   Carb Ratio  1:15  Correction Factor  1:50    Pt notes her main meal is dinner.    Pt she is not eating much during the day \"I will have a small breakfast, a piece of fruit in the afternoon and then dinner\". She will wake up around 7-730AM.  \"I will have something small and go for a run\". This AM she had a peach and water. She will another pice of fruit in the afternoon  \"Dinner is my one meal\". She eats around 730-8PM, last night she had clams, green beans, beats and wine. She will have a bowl of ice cream around 830-9PM    Pt notes that she runs, plays tennis and will swim to stay active and busy. No history of vascular disease. Pt has nephropathy, as well as CKD III (eGFR 40's). No history of neuropathy. Last eye exam was May 2021. She had cataract surgery in April 2021 \"things are going well\". 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 She notes that \"they are helping\". Current Outpatient Medications   Medication Sig    terbinafine HCL (LamISIl AT) 1 % topical cream Apply  to affected area two (2) times a day.  Fiasp U-100 Insulin 100 unit/mL soln USES IN PUMP 50 units per day max    atorvastatin (LIPITOR) 20 mg tablet daily.  valACYclovir (VALTREX) 500 mg tablet 500 mg daily. No current facility-administered medications for this visit. Not on File  Review of Systems:  - Eyes: no blurry vision or double vision  - Cardiovascular: no chest pain  - Respiratory: no shortness of breath  - Musculoskeletal: no myalgias  - Neurological: no numbness/tingling in extremities    Physical Examination:  Blood pressure 132/61, pulse 61, height 5' 2\" (1.575 m), weight 128 lb 6.4 oz (58.2 kg).   General: pleasant, no distress, good eye contact   Neck: no carotid bruits  Cardiovascular: regular, normal rate, nl s1 and s2, no m/r/g, 2+ DP pulses   Respiratory: clear bilaterally  Integumentary: no edema, no foot ulcers  Psychiatric: normal mood and affect    Diabetic foot exam:      Left Foot:              Visual Exam: normal               Pulse DP: 2+ (normal) Filament test: normal sensation               Vibratory sensation: normal                 Right Foot:              Visual Exam: normal               Pulse DP: 2+ (normal)              Filament test: normal sensation               Vibratory sensation: normal       Data Reviewed:   Her A1C today was 9.6%. Assessment/Plan:   1) DM > Her A1C today was 9.6%. She is still having high BGs after 6PM and overnight, till about 6AM.   Will make the following changes to her pump setting. MN-5AM 0.65  5AM-8PM 0.60  8PM-MN 1.10   Carb Ratio  1:15  Correction Factor  1:50    Because she has had extreme variations (40-400s) and hypoglycemia, she will benefit from the upgraded DASH system to help monitor her BGs and be able to correct the basal for variations. Will review her BG readings in 2 weeks and make further adjustments as needed. 2) HLD > Her LDL was 106 in June 2020. Pt is taking the Atorvastatin 20mg daily, which she is tolerating well. Will order lipid panel for our next visit. Pt voices understanding and agreement with the plan. RTC 3 months  Follow-up and Dispositions    · Return in about 3 months (around 10/14/2021).        Copy sent to:  Dr. Jordin Quick

## 2021-07-22 ENCOUNTER — TELEPHONE (OUTPATIENT)
Dept: ENDOCRINOLOGY | Age: 61
End: 2021-07-22

## 2021-07-22 ENCOUNTER — DOCUMENTATION ONLY (OUTPATIENT)
Dept: ENDOCRINOLOGY | Age: 61
End: 2021-07-22

## 2021-07-22 NOTE — TELEPHONE ENCOUNTER
Called pt, no answer LVM.     Per my last note I made the following changes to her Omni Pod     MN-5AM 0.65  5AM-8PM 0.60  8PM-MN 1.10   Carb Ratio  1:15  Correction Factor  1:50

## 2021-07-22 NOTE — TELEPHONE ENCOUNTER
----- Message from Aubrey Parker sent at 7/22/2021  9:25 AM EDT -----  Regarding: Antoine Le MD/ telephone  Caller's first and last name:Reason for call: question on how much insulinCallback required yes/no and why: yes/discussBest contact number(s): [phone] to clarify the request: pt stated that she wants to know how much insulin should be injected into her pod every three days. Pt stated that Antoine Le MD changed the amount but she is unclear of the amount. Please call. Thank you.

## 2021-07-23 ENCOUNTER — TELEPHONE (OUTPATIENT)
Dept: ENDOCRINOLOGY | Age: 61
End: 2021-07-23

## 2021-07-23 NOTE — TELEPHONE ENCOUNTER
----- Message from Bri Devi sent at 7/23/2021 11:19 AM EDT -----  Regarding: Dr. Sejal Benson  Pt is returning a call. Best contact number [phone].

## 2021-07-23 NOTE — TELEPHONE ENCOUNTER
Spoke with pt regarding her OmniPod insulin doses. Based on her pump setting she would be using about 40 units per day, so I instructed her to put 150 units in her pod for 3 days worth of insulin. Pt voices understanding and agreement with the plan.

## 2021-07-23 NOTE — TELEPHONE ENCOUNTER
----- Message from Maldonado Elizondo sent at 7/22/2021  5:08 PM EDT -----  Regarding: Dr. Penn Oz: [phone]  Pt returning missed called to Dr. Yeison Sims. No further information.

## 2021-09-10 ENCOUNTER — TELEPHONE (OUTPATIENT)
Dept: ENDOCRINOLOGY | Age: 61
End: 2021-09-10

## 2021-09-10 RX ORDER — INSULIN PUMP CONTROLLER
EACH MISCELLANEOUS
Qty: 5 EACH | Refills: 5 | Status: SHIPPED | OUTPATIENT
Start: 2021-09-10 | End: 2021-10-01 | Stop reason: SDUPTHER

## 2021-09-10 RX ORDER — INSULIN PUMP CONTROLLER
EACH MISCELLANEOUS
Qty: 1 EACH | Refills: 0 | Status: SHIPPED | OUTPATIENT
Start: 2021-09-10 | End: 2021-11-08 | Stop reason: SDUPTHER

## 2021-09-10 NOTE — TELEPHONE ENCOUNTER
Per 07/14/21: Because she has had extreme variations (40-400s) and hypoglycemia, she will benefit from the upgraded DASH system to help monitor her BGs and be able to correct the basal for variations. Please order the Omnipod Dash, etc.  Thank you.

## 2021-09-10 NOTE — TELEPHONE ENCOUNTER
----- Message from Jeronimo Edge III sent at 9/10/2021 12:49 PM EDT -----  Regarding: /Amadou  Medication Refill    Caller (if not patient):self       Relationship of caller (if not patient): patient      Best contact number(s): [phone]      Name of medication and dosage if known: omnipod       Is patient out of this medication (yes/no): no      Pharmacy name:[facility]    Pharmacy listed in chart? (yes/no): no  Pharmacy phone number: NA       Details to clarify the request: Pt called to switch pharmacy because its cheaper       Jeronimo Edge III

## 2021-09-13 ENCOUNTER — TELEPHONE (OUTPATIENT)
Dept: ENDOCRINOLOGY | Age: 61
End: 2021-09-13

## 2021-09-21 ENCOUNTER — TELEPHONE (OUTPATIENT)
Dept: ENDOCRINOLOGY | Age: 61
End: 2021-09-21

## 2021-09-21 RX ORDER — INSULIN PUMP CART,CONT INF,RF
CARTRIDGE (EA) SUBCUTANEOUS
Qty: 30 EACH | Refills: 3 | Status: SHIPPED | OUTPATIENT
Start: 2021-09-21 | End: 2021-09-22 | Stop reason: SDUPTHER

## 2021-09-21 NOTE — TELEPHONE ENCOUNTER
----- Message from Alex Espinoza sent at 9/21/2021 10:52 AM EDT -----  Regarding: Dr. Isai Dailey telephone  General Message/Vendor Calls      Caller's first and last name: Self      Reason for call: Return call      Callback required yes/no and why: Yes in order to know what call was in reference to      Best contact number(s): [phone]        Details to clarify the request: Pt returning phone call from Ms. Kathy Erickson on yesterday 9/20/21.             Alex Espinoza

## 2021-09-21 NOTE — TELEPHONE ENCOUNTER
I returned this call as I was the person who called her yesterday. I let her know that we were in the process of trying to get the [address] approved by her insurance. We received a approval for the starter kit but when I called [facility] to let them know, the pharmacist said it was still saying that it needed a PA. I then called the insurance company and spoke with Joseph Frost who said he ran it and it came back as a paid claim. When I told him that the pharmacist said it needed to have a PA, he said that the [address] they were running was not what was approved and he didn't know what that [address] even was. I then call [facility] back again and the pharmacist looked into it further and it seems that the [address] that was approved was for a kit that they couldn't get from their supplier and that it would cost 200.08. I then told Ms. Lacey July this information today and she said that she doesn't need the [address] [place] [address] because she has just the regular OmniPod not the Dash. She said she just needed the insulin pods for the regular one as she already had the pump part. I did a new script for the pods she needs and sent it to Dr. Val Rodney to sign.   Pako Weldon

## 2021-09-22 ENCOUNTER — TELEPHONE (OUTPATIENT)
Dept: ENDOCRINOLOGY | Age: 61
End: 2021-09-22

## 2021-09-22 RX ORDER — INSULIN PUMP CART,CONT INF,RF
CARTRIDGE (EA) SUBCUTANEOUS
Qty: 30 EACH | Refills: 3 | Status: SHIPPED | OUTPATIENT
Start: 2021-09-22 | End: 2022-10-10 | Stop reason: SDUPTHER

## 2021-09-22 RX ORDER — INSULIN PUMP CART,CONT INF,RF
CARTRIDGE (EA) SUBCUTANEOUS
Qty: 30 EACH | Refills: 3 | Status: SHIPPED | OUTPATIENT
Start: 2021-09-22 | End: 2021-09-22 | Stop reason: SDUPTHER

## 2021-09-22 NOTE — TELEPHONE ENCOUNTER
I called Ms.  Mikey Vogt and she said that [facility] had called her and they don't take her insurance so she would like the script to go to [facility]. This has been done and sent to Dr. Jenn Garcia to sign,  Shelby Cox

## 2021-09-22 NOTE — TELEPHONE ENCOUNTER
I have spoke with Ms Jodie Sheets and she would like the script sent to [facility] which has been done.   Eddie Delgaod

## 2021-09-22 NOTE — TELEPHONE ENCOUNTER
----- Message from Jose F Pila III sent at 9/22/2021 12:42 PM EDT -----  Regarding: /Telephone  General Message/Vendor Calls    Caller's first and last name: self       Reason for call: Pharmacy       Callback required yes/no and why: yes to confirm       Best contact number(s): [phone]      Details to clarify the request: pt want her refill to go to [address] shopping       Spacious

## 2021-09-22 NOTE — TELEPHONE ENCOUNTER
----- Message from Chema Mendez sent at 9/22/2021 11:16 AM EDT -----  Regarding: /Telephone  General Message/Vendor Calls         Caller's first and last name: J Luis De La Torre from [facility] pharmacy              Reason for call:  spoke with gwendolyn mccabe and confirmed prescription that needed to be filled then realized Pharmacy does not take pts insurance               Callback required yes/no and why: yes               Best contact number(s): [phone]              Details to clarify the request:spoke with gwendolyn mccabe and confirmed prescription that needed to be filled then realized Pharmacy does not take pts insurance                 Chema Mendez

## 2021-09-24 ENCOUNTER — DOCUMENTATION ONLY (OUTPATIENT)
Dept: ENDOCRINOLOGY | Age: 61
End: 2021-09-24

## 2021-09-28 ENCOUNTER — DOCUMENTATION ONLY (OUTPATIENT)
Dept: ENDOCRINOLOGY | Age: 61
End: 2021-09-28

## 2021-10-01 DIAGNOSIS — E11.21 TYPE 2 DIABETES MELLITUS WITH DIABETIC NEPHROPATHY, WITH LONG-TERM CURRENT USE OF INSULIN (HCC): ICD-10-CM

## 2021-10-01 DIAGNOSIS — Z79.4 TYPE 2 DIABETES MELLITUS WITH DIABETIC NEPHROPATHY, WITH LONG-TERM CURRENT USE OF INSULIN (HCC): ICD-10-CM

## 2021-10-01 RX ORDER — INSULIN PUMP CONTROLLER
EACH MISCELLANEOUS
Qty: 10 EACH | Refills: 5 | Status: SHIPPED | OUTPATIENT
Start: 2021-10-01 | End: 2021-11-08 | Stop reason: SDUPTHER

## 2021-10-01 NOTE — TELEPHONE ENCOUNTER
[address] requested new Rx for \"Omnipod Dash Pods #10 for 30 day supply with refills. Wants Rx faxed to [phone]. ([facility] in [facility]).

## 2021-10-04 ENCOUNTER — DOCUMENTATION ONLY (OUTPATIENT)
Dept: ENDOCRINOLOGY | Age: 61
End: 2021-10-04

## 2021-11-08 ENCOUNTER — OFFICE VISIT (OUTPATIENT)
Dept: ENDOCRINOLOGY | Age: 61
End: 2021-11-08
Payer: COMMERCIAL

## 2021-11-08 VITALS
BODY MASS INDEX: 24.77 KG/M2 | HEART RATE: 61 BPM | DIASTOLIC BLOOD PRESSURE: 65 MMHG | WEIGHT: 134.6 LBS | SYSTOLIC BLOOD PRESSURE: 130 MMHG | HEIGHT: 62 IN

## 2021-11-08 DIAGNOSIS — Z79.4 TYPE 2 DIABETES MELLITUS WITH DIABETIC NEPHROPATHY, WITH LONG-TERM CURRENT USE OF INSULIN (HCC): Primary | ICD-10-CM

## 2021-11-08 DIAGNOSIS — E11.21 TYPE 2 DIABETES MELLITUS WITH DIABETIC NEPHROPATHY, WITH LONG-TERM CURRENT USE OF INSULIN (HCC): Primary | ICD-10-CM

## 2021-11-08 DIAGNOSIS — E78.2 MIXED HYPERLIPIDEMIA: ICD-10-CM

## 2021-11-08 PROCEDURE — 99214 OFFICE O/P EST MOD 30 MIN: CPT | Performed by: INTERNAL MEDICINE

## 2021-11-08 RX ORDER — INSULIN ASPART INJECTION 100 [IU]/ML
INJECTION, SOLUTION SUBCUTANEOUS
Qty: 50 ML | Refills: 3 | Status: SHIPPED | OUTPATIENT
Start: 2021-11-08 | End: 2022-03-15 | Stop reason: SDUPTHER

## 2021-11-08 RX ORDER — INSULIN PUMP CONTROLLER
EACH MISCELLANEOUS
Qty: 10 EACH | Refills: 5 | Status: SHIPPED | OUTPATIENT
Start: 2021-11-08 | End: 2022-09-23

## 2021-11-08 RX ORDER — INSULIN PUMP CONTROLLER
EACH MISCELLANEOUS
Qty: 1 EACH | Refills: 0 | Status: SHIPPED | OUTPATIENT
Start: 2021-11-08

## 2021-11-08 RX ORDER — ATORVASTATIN CALCIUM 40 MG/1
40 TABLET, FILM COATED ORAL DAILY
Qty: 90 TABLET | Refills: 2 | Status: SHIPPED | OUTPATIENT
Start: 2021-11-08

## 2021-11-08 NOTE — PROGRESS NOTES
Chief Complaint   Patient presents with    Diabetes     pcp and pharmacy verified              History of Present Illness: Hazel Aquino is a 64 y.o. female here for follow up of diabetes. She was diagnosed with diabetes with her first pregnancy and then \"it went away\". She had GDM with her second pregnancy and it continued after that. Her A1C in September 2021 was 9.5%. Reviewing her [address] her BGs are in the 250-400 range from 6PM-10AM, it will drop to the low 200-high 100s between 10AM-and 3PM and start increasing again after 6PM.  She has not been having issues of hypoglycemia. \"I am having low around 4AM and I will shut off the meter for about 2 hours\". She reports she is being told her BGs are getting into the 60s per her DexCom, but I am not seeing any evidence of low BGs on her Clarity reports. She notes her BGs this AM was 90 and she turned her pump off before she went to work out this AM.  Her Clarity is not reflecting the lows she is reporting. When I looked at her pump the time said 1239PM (had not changed for DST)    Pt denies any recent illnesses, injuries or hospitalizations. Pt has received both COVID vaccinations (Irais Williamson). MN-5AM 0.65  5AM-8PM 0.60  8PM-MN 1.10 units per hour  Carb Ratio  1:15  Correction Factor  1:65    She is currently using an OMNI pod as well as the DexCom G6. She does upload her CGM data to the [facility]. She is changing her pod site every 3 days    She uses the DexCom G6 to check her BGs  6-8 times per day. Pt is waking around 8AM  - She will go for a run and then have some fruit after her run in the morning, around 10-11AM  - She will have lunch around 1-2PM, yesterday she had chicken salad, fruit and water. - She will have dinner around 730PM, last night she had a turkey burger, green beans and wine.   - She will have a bowl of NSA ice cream around 830-9PM    Pt notes that she runs, plays tennis and will swim to stay active and busy.    No history of vascular disease. Pt has nephropathy, as well as CKD IIIb (eGFR 40's). No history of neuropathy. Last eye exam was a week ago. She had cataract surgery in April 2021 \"things are going well\". 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 She notes that \"they are helping\". Current Outpatient Medications   Medication Sig    Insulin Pump Cartridge (Omnipod Dash 5 Pack Pod) crtg Use as directed. Max dose 50 units insulin per day. Change pod every 72 hours.  insulin pump controller (Omnipod Dash PDM Kit) misc Use with OmniPod Dash system    atorvastatin (LIPITOR) 40 mg tablet Take 1 Tablet by mouth daily.  Fiasp U-100 Insulin 100 unit/mL soln USES IN PUMP 60 units per day max    Insulin Pump Cartridge (Omnipod Insulin Refill) crtg Change pod every 3 days.  valACYclovir (VALTREX) 500 mg tablet 500 mg daily. No current facility-administered medications for this visit. No Known Allergies  Review of Systems:  - Eyes: no blurry vision or double vision  - Cardiovascular: no chest pain  - Respiratory: no shortness of breath  - Musculoskeletal: no myalgias  - Neurological: no numbness/tingling in extremities    Physical Examination:  Blood pressure 130/65, pulse 61, height 5' 2\" (1.575 m), weight 134 lb 9.6 oz (61.1 kg).   General: pleasant, no distress, good eye contact   Neck: no carotid bruits  Cardiovascular: regular, normal rate, nl s1 and s2, no m/r/g, 2+ DP pulses   Respiratory: clear bilaterally  Integumentary: no edema, no foot ulcers  Psychiatric: normal mood and affect    Diabetic foot exam:     Left Foot:   Visual Exam: callous - present   Pulse DP: 2+ (normal)   Filament test: normal sensation    Vibratory sensation: normal      Right Foot:   Visual Exam: callous - present   Pulse DP: 2+ (normal)   Filament test: normal sensation    Vibratory sensation: normal        Data Reviewed:   Component      Latest Ref Rng & Units 9/3/2021 9/3/2021 9/3/2021          12:00 AM 12:00 AM 12:00 AM   LDL-C, External         113   Creatinine, External        1.35    Hemoglobin A1c, External      % 9.5         Assessment/Plan:   1) DM > Her A1C in September 20201 was 9.5%. She reports lows in the early morning and her CGM shows highs all during the day. Will make the following changes to her pump setting. MN-5AM 0.60  5AM-8PM 0.75  8PM-MN 1.20   Carb Ratio  1:15  Correction Factor  1:50    Because she has had extreme variations (40-400s) and hypoglycemia, she will benefit from the upgraded DASH system to help monitor her BGs and be able to correct the basal for variations. Will review her BG readings in 2 weeks and make further adjustments as needed. 2) HLD > Her LDL was 113 in September 2021. She insists that she is taking the Atorvastatin 20mg daily, which she is tolerating well. Will increase her Atorvastatin to 40mg daily. Pt voices understanding and agreement with the plan. RTC 4 months  Follow-up and Dispositions    · Return in about 4 months (around 3/8/2022).        Copy sent to:  Dr. Cyndi Javier

## 2021-11-08 NOTE — LETTER
11/8/2021    Patient: Jaime Motta   YOB: 1960   Date of Visit: 11/8/2021     Benjamin Wheat, [facility],[address] [place] [phone]  Via Fax: [phone]    Dear Benjamin Wheat MD,      Thank you for referring Ms. Jaime Motta to [facility] for evaluation. My notes for this consultation are attached. If you have questions, please do not hesitate to call me. I look forward to following your patient along with you.       Sincerely,    Gopi Stack MD

## 2021-12-21 ENCOUNTER — TELEPHONE (OUTPATIENT)
Dept: ENDOCRINOLOGY | Age: 61
End: 2021-12-21

## 2021-12-21 NOTE — TELEPHONE ENCOUNTER
----- Message from Samantha Ma MD sent at 2021  2:17 PM EST -----  [address] Afternoon Ms Reyes Quan,     I reviewed your DexCom readings and it looks like your sugars are running quite all day. Looking at my last note we adjusted your pump setting to the following. MN-5AM 0.60   5AM-8PM 0.75   8PM-MN 1.20     Have you made any adjustments to your basal settings recently?      Samantha Ma MD

## 2021-12-21 NOTE — TELEPHONE ENCOUNTER
I called Ms. Paul Hurd and let her know about the unread [employer] message. She said she would go in and read it. She also said she was having some problem getting the PadSquad. I told  Her that we had sent in a script on 11/8/2021 and for her to call [employer] to see what the problem was. I told her to have them contact us with anything else we needed to do. She said she would do this.   Tomas Hardy

## 2022-01-22 LAB — HBA1C MFR BLD HPLC: 9.4 %

## 2022-03-09 ENCOUNTER — TELEPHONE (OUTPATIENT)
Dept: ENDOCRINOLOGY | Age: 62
End: 2022-03-09

## 2022-03-09 NOTE — TELEPHONE ENCOUNTER
3/9/2022    Pt called and left a vm at 12:35 pm stating she would like to speak with Nurse Andria Arevalo her numbers has been really high. She received a cortisone shot in her arm Friday and she don't know if it came from that. She also stated she received a Omnipod Dash in the mail and she needs help setting it up.      Thanks,   Laura Garcia

## 2022-03-09 NOTE — TELEPHONE ENCOUNTER
Patient states that she had a cortisone shot in her elbow on 03/04/2022. Since then her blood sugars are running high. She is using her insulin pump. (she did not read the [employer] message from 11/30/2021). She states that she has not changed her basal settings. Please call patient.  ___  She received the Omnipod pump, but needs training. Gave her the customer care number to Omnipod.

## 2022-03-09 NOTE — TELEPHONE ENCOUNTER
It has been a week since she received the cortisone injection and she notes that her BGs have already started to come back down to their pre-shot normal range. I explained that if we changed anything now we could cause low BGs so I do not feel we should adjust her pump settings at this time. Pt voices understanding and agreement with the plan.

## 2022-03-15 ENCOUNTER — OFFICE VISIT (OUTPATIENT)
Dept: ENDOCRINOLOGY | Age: 62
End: 2022-03-15
Payer: COMMERCIAL

## 2022-03-15 VITALS
DIASTOLIC BLOOD PRESSURE: 75 MMHG | BODY MASS INDEX: 25.06 KG/M2 | HEART RATE: 59 BPM | SYSTOLIC BLOOD PRESSURE: 139 MMHG | WEIGHT: 136.2 LBS | HEIGHT: 62 IN

## 2022-03-15 DIAGNOSIS — Z79.4 TYPE 2 DIABETES MELLITUS WITH DIABETIC NEPHROPATHY, WITH LONG-TERM CURRENT USE OF INSULIN (HCC): Primary | ICD-10-CM

## 2022-03-15 DIAGNOSIS — E11.21 TYPE 2 DIABETES MELLITUS WITH DIABETIC NEPHROPATHY, WITH LONG-TERM CURRENT USE OF INSULIN (HCC): Primary | ICD-10-CM

## 2022-03-15 DIAGNOSIS — E78.2 MIXED HYPERLIPIDEMIA: ICD-10-CM

## 2022-03-15 DIAGNOSIS — I10 PRIMARY HYPERTENSION: ICD-10-CM

## 2022-03-15 PROCEDURE — 99214 OFFICE O/P EST MOD 30 MIN: CPT | Performed by: INTERNAL MEDICINE

## 2022-03-15 RX ORDER — AMLODIPINE BESYLATE 2.5 MG/1
2.5 TABLET ORAL DAILY
COMMUNITY
Start: 2022-02-21

## 2022-03-15 RX ORDER — INSULIN ASPART INJECTION 100 [IU]/ML
INJECTION, SOLUTION SUBCUTANEOUS
Qty: 50 ML | Refills: 3 | Status: SHIPPED | OUTPATIENT
Start: 2022-03-15

## 2022-03-15 RX ORDER — DICLOFENAC SODIUM 75 MG/1
TABLET, DELAYED RELEASE ORAL
COMMUNITY
Start: 2022-03-04

## 2022-03-15 NOTE — PROGRESS NOTES
Chief Complaint   Patient presents with    Diabetes     pcp and pharmacy verified              History of Present Illness: Jairo Pinto is a 64 y.o. female here for follow up of diabetes. She was diagnosed with diabetes with her first pregnancy and then \"it went away\". She had GDM with her second pregnancy and it continued after that. Her most recent A1C by her PCP was 9.4% in January 2022. Pt had a cortisone injection a week ago, which ran her BGs very high, but reviewing her [address] her BGs are in the 250-400 range from 6PM-18M, it will drop to the low 200-high 100s between 10AM-and 3PM and start increasing again after 6PM.  She has not been having issues of hypoglycemia. \"If my sugars start to get low I will shut off my Pod around 10AM before I excercise and sometimes around 5PM before dinner. \"  She notes that if her BGs get to the 80s range she will turn her pod off. She notes she will do this     Pt has received all three COVID vaccinations ([facility]). MN-8PM 0.55  5AM-8PM 0.60  8PM-MN 1.20 units per hour  Carb Ratio  1:15  Correction Factor  1:50    She feels that the Carb Ratio and CF seem to be correct. She is currently using an OMNI pod as well as the DexCom G6. She does upload her CGM data to the [facility]. She just received the OMNI DASH system, but she has not started using it yet. She is changing her pod site every 3 days    She uses the DexCom G6 to check her BGs  6-8 times per day. Pt is waking around 8AM  - She will go for a run around 830AM,  and then have some fruit after her run in the morning, around 10-1030AM  - Around 1-2PM she will play tennis. - She will have lunch around 230-3PM, after Tennis. \"It will just be a snack.  - She will have dinner around 730PM, last night she had shrimp, quiche, gespatchio and wine. - She will have a bowl of NSA ice cream around 830-9PM    Pt notes that she runs, plays tennis and will swim to stay active and busy.     No history of vascular disease. Pt has nephropathy, as well as CKD IIIb (eGFR 40's). No history of neuropathy. Last eye exam was February 2022. She sees Dr. Samantha Beal  She had cataract surgery in April 2021 \"things are going well\". 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 She notes that \"they are helping\". Current Outpatient Medications   Medication Sig    amLODIPine (NORVASC) 2.5 mg tablet 2.5 mg daily.  diclofenac EC (VOLTAREN) 75 mg EC tablet     Fiasp U-100 Insulin 100 unit/mL soln USES IN PUMP 80 units per day max    Insulin Pump Cartridge (Omnipod Dash 5 Pack Pod) crtg Use as directed. Max dose 50 units insulin per day. Change pod every 72 hours.  insulin pump controller (Omnipod Dash PDM Kit) misc Use with OmniPod Dash system    atorvastatin (LIPITOR) 40 mg tablet Take 1 Tablet by mouth daily.  Insulin Pump Cartridge (Omnipod Insulin Refill) crtg Change pod every 3 days.  valACYclovir (VALTREX) 500 mg tablet 500 mg daily. No current facility-administered medications for this visit. No Known Allergies  Review of Systems:  - Eyes: no blurry vision or double vision  - Cardiovascular: no chest pain  - Respiratory: no shortness of breath  - Musculoskeletal: no myalgias  - Neurological: no numbness/tingling in extremities    Physical Examination:  Blood pressure 139/75, pulse (!) 59, height 5' 2\" (1.575 m), weight 136 lb 3.2 oz (61.8 kg).   General: pleasant, no distress, good eye contact   Neck: no carotid bruits  Cardiovascular: regular, normal rate, nl s1 and s2, no m/r/g, 2+ DP pulses   Respiratory: clear bilaterally  Integumentary: no edema, no foot ulcers  Psychiatric: normal mood and affect    Diabetic foot exam:     Left Foot:   Visual Exam: callous - present   Pulse DP: 2+ (normal)   Filament test: normal sensation    Vibratory sensation: normal      Right Foot:   Visual Exam: callous - present   Pulse DP: 2+ (normal)   Filament test: normal sensation    Vibratory sensation: normal        Data Reviewed:   A1C 9.4%    Assessment/Plan:   1) DM > Her A1C in January 2022 was 9.4%. Based on her DexCom readings and her exercise schedule we discussed changes and made the following adjustments. MN-8AM 0.70  8AM-10AM 0.50  10AM-1PM 0.70  1PM-6PM 0.55  6PM-MN 1.35   Carb Ratio  1:15  Correction Factor  1:50    Because she has had extreme variations (40-400s) and hypoglycemia, she will benefit from the upgraded DASH system to help monitor her BGs and be able to correct the basal for variations. Will review her BG readings in 3-4 weeks and make further adjustments as needed. 2) HLD > Her LDL was 113 in September 2021. She insists that she is taking the Atorvastatin 40mg daily, which we increased in November 2021. 3) HTN > Her BP today was 139/75. Will not make any changes to her HTN regimen at this time. Pt voices understanding and agreement with the plan. RTC 4 months  Follow-up and Dispositions    · Return in about 4 months (around 7/15/2022).        Copy sent to:  Dr. Gregg Graf

## 2022-03-15 NOTE — LETTER
3/15/2022    Patient: Rishabh Rodriguez   YOB: 1960   Date of Visit: 3/15/2022     Balaji Aguilar, [facility],[address] Floor, [facility] [id number]  Via Fax: [phone]    Dear Balaji Aguilar MD,      Thank you for referring Ms. Rishabh Rodriguez to [facility] for evaluation. My notes for this consultation are attached. If you have questions, please do not hesitate to call me. I look forward to following your patient along with you.       Sincerely,    Mela Echeverria MD

## 2022-05-10 ENCOUNTER — TELEPHONE (OUTPATIENT)
Dept: ENDOCRINOLOGY | Age: 62
End: 2022-05-10

## 2022-06-16 PROBLEM — Z79.4 TYPE 2 DIABETES MELLITUS WITH STAGE 3B CHRONIC KIDNEY DISEASE, WITH LONG-TERM CURRENT USE OF INSULIN (HCC): Status: ACTIVE | Noted: 2022-06-16

## 2022-06-16 PROBLEM — E11.22 TYPE 2 DIABETES MELLITUS WITH STAGE 3B CHRONIC KIDNEY DISEASE, WITH LONG-TERM CURRENT USE OF INSULIN (HCC): Status: ACTIVE | Noted: 2022-06-16

## 2022-06-16 PROBLEM — N18.32 TYPE 2 DIABETES MELLITUS WITH STAGE 3B CHRONIC KIDNEY DISEASE, WITH LONG-TERM CURRENT USE OF INSULIN (HCC): Status: ACTIVE | Noted: 2022-06-16

## 2022-08-19 LAB
CREATININE, EXTERNAL: 1.23
HBA1C MFR BLD HPLC: 8.1 %
LDL-C, EXTERNAL: 104
MICROALBUMIN UR TEST STR-MCNC: 492 MG/DL

## 2022-08-24 ENCOUNTER — DOCUMENTATION ONLY (OUTPATIENT)
Dept: ENDOCRINOLOGY | Age: 62
End: 2022-08-24

## 2022-09-23 RX ORDER — INSULIN PUMP CONTROLLER
EACH MISCELLANEOUS
Qty: 5 EACH | Refills: 0 | Status: SHIPPED | OUTPATIENT
Start: 2022-09-23

## 2022-09-24 NOTE — TELEPHONE ENCOUNTER
90 day refill sent in per Dr. Farrel Essex last office note in his absence.     Shana Fox [facility] Diabetes [facility] End[facility]

## 2022-09-27 ENCOUNTER — OFFICE VISIT (OUTPATIENT)
Dept: ENDOCRINOLOGY | Age: 62
End: 2022-09-27
Payer: COMMERCIAL

## 2022-09-27 VITALS
SYSTOLIC BLOOD PRESSURE: 149 MMHG | BODY MASS INDEX: 23.96 KG/M2 | DIASTOLIC BLOOD PRESSURE: 73 MMHG | HEIGHT: 62 IN | HEART RATE: 68 BPM | WEIGHT: 130.2 LBS

## 2022-09-27 DIAGNOSIS — E78.2 MIXED HYPERLIPIDEMIA: ICD-10-CM

## 2022-09-27 DIAGNOSIS — E11.21 TYPE 2 DIABETES MELLITUS WITH DIABETIC NEPHROPATHY, WITH LONG-TERM CURRENT USE OF INSULIN (HCC): Primary | ICD-10-CM

## 2022-09-27 DIAGNOSIS — I10 PRIMARY HYPERTENSION: ICD-10-CM

## 2022-09-27 DIAGNOSIS — Z79.4 TYPE 2 DIABETES MELLITUS WITH DIABETIC NEPHROPATHY, WITH LONG-TERM CURRENT USE OF INSULIN (HCC): Primary | ICD-10-CM

## 2022-09-27 PROCEDURE — 99215 OFFICE O/P EST HI 40 MIN: CPT | Performed by: INTERNAL MEDICINE

## 2022-09-27 PROCEDURE — 3046F HEMOGLOBIN A1C LEVEL >9.0%: CPT | Performed by: INTERNAL MEDICINE

## 2022-09-27 RX ORDER — BLOOD-GLUCOSE SENSOR
EACH MISCELLANEOUS
COMMUNITY
Start: 2022-09-02

## 2022-09-27 RX ORDER — BLOOD-GLUCOSE TRANSMITTER
EACH MISCELLANEOUS
COMMUNITY
Start: 2022-08-02

## 2022-09-27 RX ORDER — DAPAGLIFLOZIN 5 MG/1
5 TABLET, FILM COATED ORAL DAILY
COMMUNITY
Start: 2022-08-10

## 2022-09-27 NOTE — PROGRESS NOTES
Chief Complaint   Patient presents with    Diabetes     Pcp and pharmacy verified              History of Present Illness: Naya Casas is a 58 y.o. female here for follow up of diabetes. She was diagnosed with diabetes with her first pregnancy and then \"it went away\". She had GDM with her second pregnancy and it continued after that. At our last visit in March 2022 her A1C was 9.4%. I made the following changes to her insulin pump settings    MN-8AM 0.70  8AM-10AM 0.50  10AM-1PM 0.70  1PM-6PM 0.55  6PM-MN 1.35   Carb Ratio  1:15  Correction Factor  1:50    Pt reports she recently had an A1C drawn by her PCP. \"I think my A1C was 8% about a month ago, my sugars have been better. \"    Pt denies any recent illnesses, injuries or hospitalizations. Pt is using the OmniPod 4 Dash and the DexCom G6. Reviewing her DexCom readings, her BGs are running in the 200-300s from 8PM-8AM and will improve to the low-200-hihg-100s. She has had some low BGs around 4PM.    She denies any recent steroid use. Pt has received all three COVID vaccinations ([facility]). MN-8AM 0.60  8AM-10AM 0.50  10AM-1PM 0.7  1PM-6PM 0.5  6PM-MN 1.35    She feels that the Carb Ratio and CF seem to be correct. She is currently using an OMNI pod as well as the DexCom G6. She does upload her CGM data to the [facility]. She just received the OMNI DASH system, but she has not started using it yet. She is changing her pod site every 3 days    She uses the DexCom G6 to check her BGs  6-8 times per day. Pt is waking around 8AM  - She will go for a run around 830AM,  and then have some fruit after her run in the morning, around 10-1030AM  - Around 1-2PM she will play tennis. - She will have lunch around 230-3PM, after Tennis. \"It will just be a snack of fruit or nuts. \"  - She will have dinner around 70PM, last night she had fish, squash and wine.    - She will have a bowl of NSA ice cream around 830-9PM    Pt notes that she runs, plays tennis and will swim to stay active and busy. No history of vascular disease. Pt has nephropathy, as well as CKD IIIb (eGFR 40's). She is followed by Dr. Rex Matute of Nephrology. Dr. Rex Matute started her on Farxiga 5mg around March 2022. No history of neuropathy. Last eye exam was August 2022. She sees Dr. Scarlet Marroquin  She had cataract surgery in April 2021 \"things are going well\". 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 She notes that \"they are helping\". Current Outpatient Medications   Medication Sig    Farxiga 5 mg tab tablet Take 5 mg by mouth daily. ()    Dexcom G6 Sensor peggy     Dexcom G6 Transmitter peggy     insulin pump cart,cont inf,BT (Omnipod Dash Pods, Gen 4,) crtg USE AS DIRECTED - MAX DOSE 50 UNITS INSULIN DAILY - CHANGE POD EVERY 72 HOURS    amLODIPine (NORVASC) 2.5 mg tablet 2.5 mg daily. diclofenac EC (VOLTAREN) 75 mg EC tablet     Fiasp U-100 Insulin 100 unit/mL soln USES IN PUMP 80 units per day max    insulin pump controller (Omnipod Dash PDM Kit) misc Use with OmniPod Dash system    atorvastatin (LIPITOR) 40 mg tablet Take 1 Tablet by mouth daily. Insulin Pump Cartridge (Omnipod Insulin Refill) crtg Change pod every 3 days. valACYclovir (VALTREX) 500 mg tablet 500 mg daily. No current facility-administered medications for this visit. No Known Allergies  Review of Systems:  - Eyes: no blurry vision or double vision  - Cardiovascular: no chest pain  - Respiratory: no shortness of breath  - Musculoskeletal: no myalgias  - Neurological: no numbness/tingling in extremities    Physical Examination:  Blood pressure (!) 149/73, pulse 68, height 5' 2\" (1.575 m), weight 130 lb 3.2 oz (59.1 kg).   General: pleasant, no distress, good eye contact   Neck: no carotid bruits  Cardiovascular: regular, normal rate, nl s1 and s2, no m/r/g, 2+ DP pulses   Respiratory: clear bilaterally  Integumentary: no edema, no foot ulcers  Psychiatric: normal mood and affect    Diabetic foot exam:     Left Foot:   Visual Exam: callous - present   Pulse DP: 2+ (normal)   Filament test: normal sensation    Vibratory sensation: normal      Right Foot:   Visual Exam: callous - present   Pulse DP: 2+ (normal)   Filament test: normal sensation    Vibratory sensation: normal        Data Reviewed:   A1C 8.1%    Assessment/Plan:   1) DM > Her A1C in August 2022 was 8.1%. Based on her DexCom readings and her exercise schedule we discussed changes and made the following adjustments. MN-6AM 0.60 > 0.70  6AM-10AM 0.50 > 0.60  10AM-1PM 0.7  1PM-6PM 0.5  6PM-MN 1.35 > 1.50    Because she has had extreme variations (40-400s) and hypoglycemia, she will benefit from the upgraded DASH system or the new G5 OmniPod and the DexCom CGM. to help monitor her BGs and be able to correct the basal for variations. Will review her BG readings in 3-4 weeks and make further adjustments as needed. 2) HLD > She insists that she is taking the Atorvastatin 40mg daily, which we increased in November 2021. Will request records from her PCP    3) HTN > Her BP today was 149/73. Will not make any changes to her HTN regimen at this time. Pt voices understanding and agreement with the plan.     RTC 4 months      Copy sent to:  Dr. Lacho Coto

## 2022-09-27 NOTE — LETTER
9/27/2022    Patient: Ward Yan   YOB: 1960   Date of Visit: 9/27/2022     Annalee Duggan, [facility]5 [facility],[address], [address] [phone]  Via Fax: [phone]    Dear Annalee Duggan MD,      Thank you for referring Ms. Ward Yan to [facility] for evaluation. My notes for this consultation are attached. If you have questions, please do not hesitate to call me. I look forward to following your patient along with you.       Sincerely,    Samantha Ma MD

## 2022-10-10 ENCOUNTER — DOCUMENTATION ONLY (OUTPATIENT)
Dept: ENDOCRINOLOGY | Age: 62
End: 2022-10-10

## 2022-10-10 RX ORDER — INSULIN PUMP CART,CONT INF,RF
CARTRIDGE (EA) SUBCUTANEOUS
Qty: 30 EACH | Refills: 3 | Status: SHIPPED | OUTPATIENT
Start: 2022-10-10

## 2022-10-10 RX ORDER — INSULIN PUMP CONTROLLER
EACH MISCELLANEOUS
COMMUNITY
End: 2022-10-11 | Stop reason: SDUPTHER

## 2022-10-10 NOTE — TELEPHONE ENCOUNTER
10/10/2022  11:28 AM      Joy from the pt PCP Office called on behalf of pt. Joy stated the pt needs a refill on her omnipod-pods. Pt is out of medication. Joy stated dgogeafshin sent a request last week but haven't heard anything back. [facility] is [facility]  in [place]. Pharmac#[phone]  Joy#[phone]    Thanks,  David Aida

## 2022-10-11 ENCOUNTER — TELEPHONE (OUTPATIENT)
Dept: DIABETES SERVICES | Age: 62
End: 2022-10-11

## 2022-10-11 NOTE — TELEPHONE ENCOUNTER
Verbal permission obtained from  to verbally call in Rx for the DASH pods. Unable to speak with pharmacist. Was advised to leave the Rx on the prescription VM.  Left message and called in Rx on VM for pharmacist.

## 2022-10-11 NOTE — TELEPHONE ENCOUNTER
Patient called frank Garcia on 10/10/2022 at approx 6 PM indicating that when she went to  her Omnipod pods from the pharmacy she was told she needs a PA and that her last pod would  today (10/11/2022).  I indicated to her I would inform Dr Jaquelin Goodrich about that, she indicates understanding and agrees with plan

## 2022-10-11 NOTE — TELEPHONE ENCOUNTER
Jane requested a call back regarding her Omnipod DASH pod samples that she picked up from the office today. Shared with her to try the pods provided if needed to see if her DASH system will work as they are close to expiry date. Instructed to complete a minimum fill with her Ramin Mikhail and replace as soon as she has her new box of pods. Instructed her how to replace her basal rate with injected rapid acting insulin to provide basal coverage to decrease onset of DKA as completed in the past with all pump patients as \"back up plan\". Per Leobardo Rodriguez: her current basal pattern she is running is:  12 am to 10 am: 0.6 unit/hour  10 am to 6 pm: 0.5 unit/hour  6 pm to 12 am: 1.00 unit/hour  She is having hypoglycemia around 6-7 am and again around 4-5 pm. She verbalized correct method for correcting low BG and was advised to make sure that she does not miss having small carb and protein snack if she has an event over the next 12 hours that she needs to treat. She has an active Dexcom session and is attentive to her alerts/alarms. Advised her to reach out to  for setting adjustments and to [facility] dosing [facility].

## 2022-10-12 RX ORDER — INSULIN PUMP CONTROLLER
EACH MISCELLANEOUS
Qty: 10 EACH | Refills: 11 | OUTPATIENT
Start: 2022-10-12

## 2022-10-17 ENCOUNTER — DOCUMENTATION ONLY (OUTPATIENT)
Dept: ENDOCRINOLOGY | Age: 62
End: 2022-10-17

## 2022-10-25 ENCOUNTER — TELEPHONE (OUTPATIENT)
Dept: ENDOCRINOLOGY | Age: 62
End: 2022-10-25

## 2022-10-25 RX ORDER — CALCIUM CARB/VITAMIN D3/VIT K1 500-100-40
TABLET,CHEWABLE ORAL
Qty: 100 EACH | Refills: 0 | Status: SHIPPED | OUTPATIENT
Start: 2022-10-25

## 2022-10-25 NOTE — TELEPHONE ENCOUNTER
Spoke with patient. She states that the What the Trend will not recharge. She does not know what to do about her insulin. This am her blood sugar was 76. She shut off pump, drank OJ. Blood sugar went to 96. Just now (10:30 am) her blood sugar is 120.

## 2022-10-25 NOTE — TELEPHONE ENCOUNTER
10/25/2022  9:54 AM     Pt called to let dr Himanshu Shoemaker know that her omnipod dash has stopped working and [employer]. She called the omnipod telephone number and they are sending her out a new one but it wont be here until tomorrow. She doesn't have any way to check her sugars.   Please call her back at [phone] thanks

## 2022-10-25 NOTE — TELEPHONE ENCOUNTER
I called Ms Frank Clifford to discuss the plans for today, but she did not answer. I left a message for her to call back. My recommendation for today is that she test her blood sugar before each meal and two hours after each meal and again a bedtime. Before each meal test her blood sugar and give herself one unit of Fiasp for every 10 grams of carbs she will be eating, and in addition give herself any correction Fiasp for high blood sugars. 150-199 1 additional units  200-249 2 additional units  250-299 3 additional units  300-349 4 additional units  350-399 5 additional units  400-449 6 additional units  450-499 7 additional units  500-549 8 additional units  550+ 9 additional units    When she tests her blood sugar two hours after her meal, she take additional correction.

## 2022-11-16 ENCOUNTER — CLINICAL SUPPORT (OUTPATIENT)
Dept: DIABETES SERVICES | Age: 62
End: 2022-11-16
Payer: COMMERCIAL

## 2022-11-16 DIAGNOSIS — N18.32 TYPE 2 DIABETES MELLITUS WITH STAGE 3B CHRONIC KIDNEY DISEASE, WITH LONG-TERM CURRENT USE OF INSULIN (HCC): Primary | ICD-10-CM

## 2022-11-16 DIAGNOSIS — Z79.4 TYPE 2 DIABETES MELLITUS WITH STAGE 3B CHRONIC KIDNEY DISEASE, WITH LONG-TERM CURRENT USE OF INSULIN (HCC): Primary | ICD-10-CM

## 2022-11-16 DIAGNOSIS — E11.21 TYPE 2 DIABETES MELLITUS WITH DIABETIC NEPHROPATHY, WITH LONG-TERM CURRENT USE OF INSULIN (HCC): Primary | ICD-10-CM

## 2022-11-16 DIAGNOSIS — Z79.4 TYPE 2 DIABETES MELLITUS WITH DIABETIC NEPHROPATHY, WITH LONG-TERM CURRENT USE OF INSULIN (HCC): Primary | ICD-10-CM

## 2022-11-16 DIAGNOSIS — E11.22 TYPE 2 DIABETES MELLITUS WITH STAGE 3B CHRONIC KIDNEY DISEASE, WITH LONG-TERM CURRENT USE OF INSULIN (HCC): Primary | ICD-10-CM

## 2022-11-16 PROCEDURE — G0108 DIAB MANAGE TRN  PER INDIV: HCPCS | Performed by: DIETITIAN, REGISTERED

## 2022-12-01 ENCOUNTER — TELEPHONE (OUTPATIENT)
Dept: ENDOCRINOLOGY | Age: 62
End: 2022-12-01

## 2022-12-01 NOTE — TELEPHONE ENCOUNTER
Pt called 12/1 @ 10:56 AM.    Pt stated she would like to talk to the nurse about upgrading her omnipod dash to the omnipod 5.     VK#[phone]

## 2022-12-22 ENCOUNTER — DOCUMENTATION ONLY (OUTPATIENT)
Dept: ENDOCRINOLOGY | Age: 62
End: 2022-12-22

## 2022-12-30 RX ORDER — SYRING-NEEDL,DISP,INSUL,0.3 ML 31GX15/64"
SYRINGE, EMPTY DISPOSABLE MISCELLANEOUS
Qty: 200 PEN NEEDLE | Refills: 0 | Status: SHIPPED | OUTPATIENT
Start: 2022-12-30

## 2023-01-26 ENCOUNTER — OFFICE VISIT (OUTPATIENT)
Dept: ENDOCRINOLOGY | Age: 63
End: 2023-01-26
Payer: COMMERCIAL

## 2023-01-26 ENCOUNTER — DOCUMENTATION ONLY (OUTPATIENT)
Dept: ENDOCRINOLOGY | Age: 63
End: 2023-01-26

## 2023-01-26 VITALS
HEART RATE: 67 BPM | BODY MASS INDEX: 24.84 KG/M2 | HEIGHT: 62 IN | WEIGHT: 135 LBS | DIASTOLIC BLOOD PRESSURE: 65 MMHG | SYSTOLIC BLOOD PRESSURE: 135 MMHG

## 2023-01-26 DIAGNOSIS — E78.2 MIXED HYPERLIPIDEMIA: ICD-10-CM

## 2023-01-26 DIAGNOSIS — I10 PRIMARY HYPERTENSION: ICD-10-CM

## 2023-01-26 DIAGNOSIS — E11.21 TYPE 2 DIABETES MELLITUS WITH DIABETIC NEPHROPATHY, WITH LONG-TERM CURRENT USE OF INSULIN (HCC): Primary | ICD-10-CM

## 2023-01-26 DIAGNOSIS — Z79.4 TYPE 2 DIABETES MELLITUS WITH DIABETIC NEPHROPATHY, WITH LONG-TERM CURRENT USE OF INSULIN (HCC): Primary | ICD-10-CM

## 2023-01-26 LAB — HBA1C MFR BLD HPLC: 8.3 %

## 2023-01-26 PROCEDURE — 99215 OFFICE O/P EST HI 40 MIN: CPT | Performed by: INTERNAL MEDICINE

## 2023-01-26 PROCEDURE — 95251 CONT GLUC MNTR ANALYSIS I&R: CPT | Performed by: INTERNAL MEDICINE

## 2023-01-26 PROCEDURE — 83036 HEMOGLOBIN GLYCOSYLATED A1C: CPT | Performed by: INTERNAL MEDICINE

## 2023-01-26 PROCEDURE — 3078F DIAST BP <80 MM HG: CPT | Performed by: INTERNAL MEDICINE

## 2023-01-26 PROCEDURE — 3075F SYST BP GE 130 - 139MM HG: CPT | Performed by: INTERNAL MEDICINE

## 2023-01-26 RX ORDER — SODIUM ZIRCONIUM CYCLOSILICATE 5 G/5G
5 POWDER, FOR SUSPENSION ORAL EVERY OTHER DAY
COMMUNITY

## 2023-01-26 RX ORDER — INSULIN ASPART INJECTION 100 [IU]/ML
INJECTION, SOLUTION SUBCUTANEOUS
Qty: 50 ML | Refills: 3 | Status: SHIPPED | OUTPATIENT
Start: 2023-01-26

## 2023-01-26 RX ORDER — INSULIN PMP CART,AUT,G6/7,CNTR
1 EACH SUBCUTANEOUS
Qty: 30 EACH | Refills: 0 | Status: SHIPPED | OUTPATIENT
Start: 2023-01-26

## 2023-01-26 RX ORDER — INSULIN PMP CART,AUT,G6/7,CNTR
1 EACH SUBCUTANEOUS
Qty: 90 EACH | Refills: 3 | Status: SHIPPED | OUTPATIENT
Start: 2023-01-26

## 2023-01-26 NOTE — PROGRESS NOTES
Chief Complaint   Patient presents with    Diabetes     Pcp and pharmacy verified              History of Present Illness: Dillan Hoffmann is a 58 y.o. female here for follow up of diabetes. She was diagnosed with diabetes with her first pregnancy and then \"it went away\". She had GDM with her second pregnancy and it continued after that. At our last visit in September 2022 her A1C was 8.1% and I made the following change to her pump settings. MN-6AM 0.60 > 0.70  6AM-10AM 0.50 > 0.60  10AM-1PM 0.7  1PM-6PM 0.5  6PM-MN 1.35 > 1.50    \"I have been having some low BGs around 3-4 in the morning. They occur 2-3 times per week. \"  \"This seems to occur when I am eating less the night prior to the low BGs. \"    I reviewed her CGM readings and her BGs are running high after 9PM and are high till 8-9AM, but for the last 14 days her BGs have been lower between 1-3AM.    Pt denies any recent illnesses, injuries or hospitalizations. Her A1C today was 8.3%. Her current OmniPod Gen4 settings are as follows. MN-6AM 0.65  6AM-10AM 0.60  10AM-1PM 0.70  1PM-6PM 0.45  6PM-MN 1.5   Carb Ratio  1:15  Correction Factor  1:50    Pt notes that she is not typically using the carb calculators or correcting for highs. She denies any recent steroid use. She is changing her pod site every 3 days    She uses the DexCom G6 to check her BGs  6-8 times per day. Pt is waking around 8AM  - She will go for a run around 830AM,  and then have some fruit after her run in the morning, around 10-1030AM  - Her first meal is around 1130AM.  - Around 1-2PM she will play tennis. - She will have lunch around 230-3PM, after Tennis. \"It will just be a snack of fruit or nuts. \"  - She will have dinner around 7-730PM, last night she had oysters, spinach, salad and wine. - She will have a bowl of NSA ice cream around 830PM    Pt notes that she runs, plays tennis and will swim to stay active and busy. No history of vascular disease.       Pt has nephropathy, as well as CKD IIIb (eGFR 40's). She is followed by Dr. Selene Barrow of Nephrology. Dr. Selene Barrow started her on Farxiga 5mg around March 2022. No history of neuropathy. Last eye exam was December 2022. She sees Dr. Aniket Adamson  She had cataract surgery in April 2021 \"things are going well\". 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 She notes that \"they are helping\". Current Outpatient Medications   Medication Sig    sodium zirconium cyclosilicate (Lokelma) 5 gram powder packet Take 5 g by mouth every other day. insulin pump cart,auto,BT-cntr (Omnipod 5 G6 Intro Kit, Gen 5,) crtg 1 Each by SubCUTAneous route every three (3) days. insulin pump cart,automated,BT (Omnipod 5 G6 Pods, Gen 5,) crtg 1 Each by SubCUTAneous route every three (3) days. Fiasp U-100 Insulin 100 unit/mL soln USES IN PUMP 80 units per day max    Droplet Insulin Syringe 0.3 mL 31 gauge x 15/64\" syrg USE TO INJECT INSULIN UP TO 6 TIMES DAILY    insulin pump cart,cont inf,BT (Omnipod Dash Pods, Gen 4,) crtg Change pod every 3 days    insulin pump cart,cont inf,RF (Omnipod Classic Pods, Gen 3,) crtg Change pod every 3 days. Farxiga 5 mg tab tablet Take 5 mg by mouth daily. ()    Dexcom G6 Sensor peggy     Dexcom G6 Transmitter peggy     insulin pump cart,cont inf,BT (Omnipod Dash Pods, Gen 4,) crtg USE AS DIRECTED - MAX DOSE 50 UNITS INSULIN DAILY - CHANGE POD EVERY 72 HOURS    amLODIPine (NORVASC) 2.5 mg tablet 2.5 mg daily. diclofenac EC (VOLTAREN) 75 mg EC tablet     insulin pump controller (Omnipod Dash PDM Kit) misc Use with OmniPod Dash system    atorvastatin (LIPITOR) 40 mg tablet Take 1 Tablet by mouth daily. valACYclovir (VALTREX) 500 mg tablet 500 mg daily. No current facility-administered medications for this visit.      No Known Allergies  Review of Systems:  - Eyes: no blurry vision or double vision  - Cardiovascular: no chest pain  - Respiratory: no shortness of breath  - Musculoskeletal: no myalgias  - Neurological: no numbness/tingling in extremities    Physical Examination:  Blood pressure 135/65, pulse 67, height 5' 2\" (1.575 m), weight 135 lb (61.2 kg). General: pleasant, no distress, good eye contact   Neck: no carotid bruits  Cardiovascular: regular, normal rate, nl s1 and s2, no m/r/g, 2+ DP pulses   Respiratory: clear bilaterally  Integumentary: no edema, no foot ulcers  Psychiatric: normal mood and affect    Diabetic foot exam:     Left Foot:   Visual Exam: callous - present   Pulse DP: 2+ (normal)   Filament test: normal sensation    Vibratory sensation: normal      Right Foot:   Visual Exam: callous - present   Pulse DP: 2+ (normal)   Filament test: normal sensation    Vibratory sensation: normal        Data Reviewed:   Her A1C today was 8.3%. Assessment/Plan:   1) DM > Her A1C today was 8.3%. Based on her DexCom readings and her exercise schedule we discussed changes and adjusted her pump setting. Because she has had extreme variations (40-400s) and hypoglycemia, she will benefit from the upgraded Gen 5 and the DexCom CGM. to help monitor her BGs and be able to correct the basal for variations. Will review her BG readings in 3-4 weeks and make further adjustments as needed. 2) HLD > She insists that she is taking the Atorvastatin 40mg daily, which we increased in November 2021. I will order a lipid panel and CMP. 3) HTN > Her BP today was 135/65. Will not make any changes to her HTN regimen at this time. Pt voices understanding and agreement with the plan. RTC 4 months  Follow-up and Dispositions    Return in about 3 months (around 4/26/2023).          Copy sent to:  Dr. Emmy Olguin

## 2023-01-26 NOTE — LETTER
1/26/2023    Patient: Devika Boone   YOB: 1960   Date of Visit: 1/26/2023     Elvis Collins, [facility] [facility],[address], [place] [id number]  Via Fax: [phone]    Dear Elvis Collins MD,      Thank you for referring Ms. Devika Boone to [facility] for evaluation. My notes for this consultation are attached. If you have questions, please do not hesitate to call me. I look forward to following your patient along with you.       Sincerely,    Conor Ny MD

## 2023-02-11 LAB — CREATININE, EXTERNAL: 1.12

## 2023-02-12 LAB
HBA1C MFR BLD HPLC: 8.4 %
LDL-C, EXTERNAL: 93
MICROALBUMIN UR TEST STR-MCNC: 440 MG/DL

## 2023-02-27 ENCOUNTER — TELEPHONE (OUTPATIENT)
Dept: ENDOCRINOLOGY | Age: 63
End: 2023-02-27

## 2023-03-03 ENCOUNTER — TELEPHONE (OUTPATIENT)
Dept: ENDOCRINOLOGY | Age: 63
End: 2023-03-03

## 2023-03-03 NOTE — TELEPHONE ENCOUNTER
MARY ANNI: Patient left a message on VM stating that she cannot respond via MyChart. She has started wearing the Omnipod 5 for 2 days and blood sugars are getting better.

## 2023-03-22 ENCOUNTER — TELEPHONE (OUTPATIENT)
Dept: ENDOCRINOLOGY | Age: 63
End: 2023-03-22

## 2023-03-22 NOTE — TELEPHONE ENCOUNTER
Joy from PCP office called stating patient requested refill of Omnipod pods. Advised her that  had send a prescription on 01/26/23 for #90 with 3 refills. Advised will call patient. Left message on patient's VM stating above. Gave VM the telephone number to [facility] to call and ask for a refill or the status of the refill needed.

## 2023-04-01 DIAGNOSIS — Z79.4 TYPE 2 DIABETES MELLITUS WITH DIABETIC NEPHROPATHY, WITH LONG-TERM CURRENT USE OF INSULIN (HCC): ICD-10-CM

## 2023-04-01 DIAGNOSIS — I10 PRIMARY HYPERTENSION: ICD-10-CM

## 2023-04-01 DIAGNOSIS — E11.21 TYPE 2 DIABETES MELLITUS WITH DIABETIC NEPHROPATHY, WITH LONG-TERM CURRENT USE OF INSULIN (HCC): ICD-10-CM

## 2023-04-01 DIAGNOSIS — E78.2 MIXED HYPERLIPIDEMIA: ICD-10-CM

## 2023-05-24 RX ORDER — VALACYCLOVIR HYDROCHLORIDE 500 MG/1
500 TABLET, FILM COATED ORAL DAILY
COMMUNITY
Start: 2020-04-17

## 2023-05-24 RX ORDER — ATORVASTATIN CALCIUM 40 MG/1
40 TABLET, FILM COATED ORAL DAILY
COMMUNITY
Start: 2021-11-08

## 2023-05-24 RX ORDER — AMLODIPINE BESYLATE 2.5 MG/1
2.5 TABLET ORAL DAILY
COMMUNITY
Start: 2022-02-21

## 2023-05-24 RX ORDER — INSULIN ASPART INJECTION 100 [IU]/ML
INJECTION, SOLUTION SUBCUTANEOUS
COMMUNITY
Start: 2023-01-26

## 2023-05-24 RX ORDER — DICLOFENAC SODIUM 75 MG/1
TABLET, DELAYED RELEASE ORAL
COMMUNITY
Start: 2022-03-04

## 2023-05-30 ENCOUNTER — OFFICE VISIT (OUTPATIENT)
Age: 63
End: 2023-05-30
Payer: MEDICAID

## 2023-05-30 VITALS
HEART RATE: 66 BPM | DIASTOLIC BLOOD PRESSURE: 64 MMHG | BODY MASS INDEX: 25.03 KG/M2 | WEIGHT: 136 LBS | HEIGHT: 62 IN | SYSTOLIC BLOOD PRESSURE: 139 MMHG

## 2023-05-30 DIAGNOSIS — E78.2 MIXED HYPERLIPIDEMIA: ICD-10-CM

## 2023-05-30 DIAGNOSIS — Z79.4 TYPE 2 DIABETES MELLITUS WITH DIABETIC NEPHROPATHY, WITH LONG-TERM CURRENT USE OF INSULIN (HCC): Primary | ICD-10-CM

## 2023-05-30 DIAGNOSIS — E11.21 TYPE 2 DIABETES MELLITUS WITH DIABETIC NEPHROPATHY, WITH LONG-TERM CURRENT USE OF INSULIN (HCC): Primary | ICD-10-CM

## 2023-05-30 DIAGNOSIS — I10 ESSENTIAL (PRIMARY) HYPERTENSION: ICD-10-CM

## 2023-05-30 LAB — HBA1C MFR BLD: 9.2 %

## 2023-05-30 PROCEDURE — 95251 CONT GLUC MNTR ANALYSIS I&R: CPT | Performed by: INTERNAL MEDICINE

## 2023-05-30 PROCEDURE — 3075F SYST BP GE 130 - 139MM HG: CPT | Performed by: INTERNAL MEDICINE

## 2023-05-30 PROCEDURE — 99214 OFFICE O/P EST MOD 30 MIN: CPT | Performed by: INTERNAL MEDICINE

## 2023-05-30 PROCEDURE — 83036 HEMOGLOBIN GLYCOSYLATED A1C: CPT | Performed by: INTERNAL MEDICINE

## 2023-05-30 PROCEDURE — 3078F DIAST BP <80 MM HG: CPT | Performed by: INTERNAL MEDICINE

## 2023-05-30 RX ORDER — HYDROCHLOROTHIAZIDE 12.5 MG/1
12.5 CAPSULE, GELATIN COATED ORAL DAILY
COMMUNITY

## 2023-05-30 RX ORDER — SODIUM BICARBONATE 650 MG/1
650 TABLET ORAL 2 TIMES DAILY
COMMUNITY

## 2023-05-30 NOTE — PROGRESS NOTES
kg).  General: pleasant, no distress, good eye contact   Neck: no carotid bruits  Cardiovascular: regular, normal rate, nl s1 and s2, no m/r/g, 2+ DP pulses   Respiratory: clear bilaterally  Integumentary: no edema, no foot ulcers,   Psychiatric: normal mood and affect    Diabetic foot exam:   Left Foot:   Visual Exam: callous- present   Pulse DP: 2+ (normal)   Filament test: normal sensation   Vibratory Sensation: normal  Right Foot:   Visual Exam: callous- present   Pulse DP: 2+ (normal)   Filament test: normal sensation   Vibratory Sensation: normal      Data Reviewed:           Her A1C today was 9.2%. Assessment/Plan:   1) DM > Her A1C today was 9.2%. Based on her DexCom readings I made some changes to her basal rate, but continued the automatic mode. MN-4AM 0.7 units per hour  4AM-11AM 0.7 units per hour  11AM-1PM 0.7 units per hour  1PM-6PM 0.5 units per hour  6PM-MN 1.20 units per hour. Because she has had extreme variations (40-400s) and hypoglycemia, she will benefit from the upgraded Gen 5 and the DexCom CGM. to help monitor her BGs and be able to correct the basal for variations. Will review her BG readings in 3-4 weeks and make further adjustments as needed. 2) HLD > She insists that she is taking the Atorvastatin 40mg daily, which we increased in November 2021. Her lipid panel was at goal in February 2023. 3) HTN > Her BP today was 139/64. Will not make any changes to her HTN regimen at this time. Pt voices understanding and agreement with the plan.       RTC 4 months     Copy sent to:  Dr. Lon Cheadle

## 2023-06-01 ENCOUNTER — CLINICAL DOCUMENTATION (OUTPATIENT)
Age: 63
End: 2023-06-01

## 2023-08-02 ENCOUNTER — CLINICAL DOCUMENTATION (OUTPATIENT)
Age: 63
End: 2023-08-02

## 2023-08-08 ENCOUNTER — TELEPHONE (OUTPATIENT)
Age: 63
End: 2023-08-08

## 2023-08-08 LAB — HBA1C MFR BLD HPLC: 8.8 %

## 2023-08-08 NOTE — TELEPHONE ENCOUNTER
Pt call returned- reporting that she has upgraded her smartphone and it is not compatible with the Omnipod 5 federico. She was directed by [facility] customer care team to reach out to educator for help with her federico. This educator did not complete her training and has not seen this patient since . I reviewed 's clinical note from her visits on 23 and 23 to review pod setting changes for her federico. I had patient read settings she programmed into her federico. By verbal read back all are correct but two settings for her basal program:   12 am to 4 am: 0.70 unit/hour  4 am to 11 am: 0.45 unit/hour - this was changed by the patient due to hypoglycemia. 11 am to 1 pm: 0.75 unit/hour  1 pm to 6 pm: 0.05 unit/hour was corrected to 0.5 unit/hour per 's note  5.30.23  - this is a programming error by the patient. 6 pm to 12 pm: 1.20 unit/hour    Her bolus settings are as follows: Target B am to 12 am: 120 mg/dl (150 mg/dl) per pt recall. Insulin to carb ratio: 12 am to 12 am: 1 unit:15 g   Correction factor: 12 am to 12 am: 1 unit: 50 mg/dl. Duration for insulin action: 4 hours  Max bolus: 20 units  Max basal: set to 3.0 units/hour     Advised her to make sure to turn off auto updates for her smartphone software so her phone software does update beyond the federico. She was also advised to consult the [facility] website to phone/federico compatibility prior to upgrading smartphones in the future. Navigated her to the section of the Omnipod website for future reference:     40 minutes were spent assisting the patient with this issue.  was advised of the setting changes for review and to contact patient.     Dee Dee HAYWARD, Certified Pump Trainer

## 2023-08-29 ENCOUNTER — TELEPHONE (OUTPATIENT)
Age: 63
End: 2023-08-29

## 2023-08-29 NOTE — TELEPHONE ENCOUNTER
Left message on VM that there is an unread eriQoo message for her to read from 08/02/23. The message was read to her VM.

## 2023-08-31 ENCOUNTER — CLINICAL DOCUMENTATION (OUTPATIENT)
Age: 63
End: 2023-08-31

## 2023-10-05 ENCOUNTER — OFFICE VISIT (OUTPATIENT)
Age: 63
End: 2023-10-05

## 2023-10-05 VITALS
SYSTOLIC BLOOD PRESSURE: 146 MMHG | DIASTOLIC BLOOD PRESSURE: 67 MMHG | BODY MASS INDEX: 24.92 KG/M2 | HEIGHT: 62 IN | WEIGHT: 135.4 LBS | HEART RATE: 61 BPM

## 2023-10-05 DIAGNOSIS — I10 ESSENTIAL (PRIMARY) HYPERTENSION: ICD-10-CM

## 2023-10-05 DIAGNOSIS — Z79.4 TYPE 2 DIABETES MELLITUS WITH DIABETIC NEPHROPATHY, WITH LONG-TERM CURRENT USE OF INSULIN (HCC): Primary | ICD-10-CM

## 2023-10-05 DIAGNOSIS — E78.2 MIXED HYPERLIPIDEMIA: ICD-10-CM

## 2023-10-05 DIAGNOSIS — E11.21 TYPE 2 DIABETES MELLITUS WITH DIABETIC NEPHROPATHY, WITH LONG-TERM CURRENT USE OF INSULIN (HCC): Primary | ICD-10-CM

## 2023-10-05 RX ORDER — INSULIN PMP CART,AUT,G6/7,CNTR
EACH SUBCUTANEOUS
COMMUNITY
Start: 2023-09-21

## 2023-10-05 RX ORDER — PROCHLORPERAZINE 25 MG/1
SUPPOSITORY RECTAL
COMMUNITY
Start: 2023-09-27

## 2023-10-05 RX ORDER — PROCHLORPERAZINE 25 MG/1
SUPPOSITORY RECTAL
COMMUNITY
Start: 2023-08-28

## 2023-10-05 NOTE — PROGRESS NOTES
Chief Complaint   Patient presents with    Diabetes     Pcp and pharmacy verified     History of Present Illness: Jessica Watt is a 61 y.o. female here for follow up of diabetes. At our last visit in May 2023 her A1C was 9.2%. Based on her DexCom readings I did make adjustments to her insulin pump settings. MN-4AM 0.7 units per hour  4AM-11AM 0.7 units per hour  11AM-1PM 0.7 units per hour  1PM-6PM 0.5 units per hour  6PM-MN 1.20 units per hour. Pt denies any recent illnesses, injuries or hospitalizations. She is using a DexCom G6 with her OmniPod 5 to monitor her BG   I reviewed her last 30 days of CGM data. Her A1C in August was 8.8%. Pt notes that she has been bolusing to cover her meals. She also notes she was having low BG around 4AM, so she made some adjustments to her pump settings. She notes that she is still using the automated mode. Her current Pump settings    Basal  MN-4AM: 0.1 units per hour  4AM-11AM 0.45 units per hour  11AM-1PM 0.75 units per hour  1PM-6PM 0.5 units per hour  6PM-MN 1.2 units per hour        She denies any recent steroid use. She is changing her pod site every 3 days      Pt is waking around 8AM   - She will go for a run around 830AM,  and then have some fruit after her run in the morning, around 10-1030AM   - Her first meal is around 1130AM.   - Around 1-2PM she will play tennis. - She is not eating lunch, but will \"snack in the afternoon\". - She will have dinner around 7-730PM, last night \"I was at a party and I had a lot of different foods\". - She will have a bowl of NSA ice cream around 830PM      Pt notes that she runs, plays tennis and will swim to stay active and busy. No history of vascular disease. Pt has nephropathy, as well as CKD IIIb (eGFR 40's). She is followed by Dr. Hussein Ramirez. She last saw him in June 2023. No history of neuropathy. Last eye exam was September 2023.  She sees Dr. Rosa Bendering  She had

## 2023-11-10 ENCOUNTER — TELEPHONE (OUTPATIENT)
Age: 63
End: 2023-11-10

## 2023-11-10 NOTE — TELEPHONE ENCOUNTER
Patient called stating that she needs the settings for her Omnipod 5. The settings can be sent via [facility]. OV 10/5/23: Based on her DexCom readings I made some changes to her basal rate, but continued the automatic mode. Because she has had extreme variations (40-400s) and hypoglycemia, she will benefit from the upgraded Gen 5 and the DexCom CGM. to help monitor her BGs and be able to correct the basal for variations. Please send settings to [facility].

## 2024-02-05 ENCOUNTER — OFFICE VISIT (OUTPATIENT)
Age: 64
End: 2024-02-05
Payer: COMMERCIAL

## 2024-02-05 VITALS
DIASTOLIC BLOOD PRESSURE: 71 MMHG | WEIGHT: 136.4 LBS | HEART RATE: 65 BPM | BODY MASS INDEX: 25.1 KG/M2 | HEIGHT: 62 IN | SYSTOLIC BLOOD PRESSURE: 147 MMHG

## 2024-02-05 DIAGNOSIS — E11.21 TYPE 2 DIABETES MELLITUS WITH DIABETIC NEPHROPATHY, WITH LONG-TERM CURRENT USE OF INSULIN (HCC): Primary | ICD-10-CM

## 2024-02-05 DIAGNOSIS — E78.2 MIXED HYPERLIPIDEMIA: ICD-10-CM

## 2024-02-05 DIAGNOSIS — I10 ESSENTIAL (PRIMARY) HYPERTENSION: ICD-10-CM

## 2024-02-05 DIAGNOSIS — Z79.4 TYPE 2 DIABETES MELLITUS WITH DIABETIC NEPHROPATHY, WITH LONG-TERM CURRENT USE OF INSULIN (HCC): Primary | ICD-10-CM

## 2024-02-05 LAB — HBA1C MFR BLD: 9.1 %

## 2024-02-05 PROCEDURE — 99214 OFFICE O/P EST MOD 30 MIN: CPT | Performed by: INTERNAL MEDICINE

## 2024-02-05 PROCEDURE — 3077F SYST BP >= 140 MM HG: CPT | Performed by: INTERNAL MEDICINE

## 2024-02-05 PROCEDURE — 95251 CONT GLUC MNTR ANALYSIS I&R: CPT | Performed by: INTERNAL MEDICINE

## 2024-02-05 PROCEDURE — 3078F DIAST BP <80 MM HG: CPT | Performed by: INTERNAL MEDICINE

## 2024-02-05 PROCEDURE — 83036 HEMOGLOBIN GLYCOSYLATED A1C: CPT | Performed by: INTERNAL MEDICINE

## 2024-02-05 RX ORDER — HYDROCHLOROTHIAZIDE 12.5 MG/1
12.5 CAPSULE, GELATIN COATED ORAL EVERY MORNING
COMMUNITY
Start: 2024-01-15

## 2024-02-05 RX ORDER — PROCHLORPERAZINE 25 MG/1
SUPPOSITORY RECTAL
Qty: 3 EACH | Refills: 11 | Status: SHIPPED | OUTPATIENT
Start: 2024-02-05

## 2024-02-05 RX ORDER — PROCHLORPERAZINE 25 MG/1
SUPPOSITORY RECTAL
Qty: 1 EACH | Refills: 3 | Status: SHIPPED | OUTPATIENT
Start: 2024-02-05

## 2024-02-05 RX ORDER — INSULIN PMP CART,AUT,G6/7,CNTR
EACH SUBCUTANEOUS
Qty: 10 EACH | Refills: 11 | Status: SHIPPED | OUTPATIENT
Start: 2024-02-05

## 2024-02-05 RX ORDER — INSULIN ASPART INJECTION 100 [IU]/ML
INJECTION, SOLUTION SUBCUTANEOUS
Qty: 2 ML | Refills: 11 | Status: SHIPPED | OUTPATIENT
Start: 2024-02-05

## 2024-02-05 NOTE — PROGRESS NOTES
Chief Complaint   Patient presents with    Diabetes     Pcp and pharmacy verified     History of Present Illness: Corie Kee is a 63 y.o. female here for follow up of diabetes.    Pt denies any recent illnesses, injuries or hospitalizations.    She is using a DexCom G6 with her OmniPod 5 to monitor her BG   I reviewed her last 30 days of CGM data.              Her A1C today was 9.1%.    She notes she has had some low BG when she over-corrects for high BG.  \"I am underdoing for my meals, because I am afraid that if I give myself what I am supposed to, I will go low. So I underbolus, wait for it to run high, then bolus more.    She notes that she is still using the automated mode.    Her current Pump settings    Basal  MN-4AM: 0.1 units per hour  4AM-11AM 0.45 units per hour  11AM-1PM 0.75 units per hour  1PM-6PM 0.5 units per hour  6PM-MN 1.3 units per hour  Carb Ratio  1:15  Correction   1:50     She denies any recent steroid use.     She is changing her pod site every 3 days     Pt is waking around 8AM  - She will go for a run around 830AM,  and then have some fruit after her run in the morning, around 10-1030AM  - Her first meal is around 1130AM.  - Around 1-2PM she will play tennis.  - She is not eating lunch, but will \"snack in the afternoon\".  - She will have dinner around 7PM, last night she had chicken, brussel sprouts, baked potato and wine.     - She will have a bowl of NSA ice cream around 830PM      Pt notes that she runs, plays tennis and will swim to stay active and busy.      No history of vascular disease.        Pt has nephropathy, as well as CKD IIIb (eGFR 40's). She is followed by Dr. Marie Sequeira. She last saw him in January 2024. \"He said things were looking fine.\"      No history of neuropathy.        Last eye exam was January 2024. She sees Dr. Rivera  She had cataract surgery in April 2021 \"things are going well\".   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 She notes that

## 2024-06-01 DIAGNOSIS — E11.21 TYPE 2 DIABETES MELLITUS WITH DIABETIC NEPHROPATHY, WITH LONG-TERM CURRENT USE OF INSULIN (HCC): ICD-10-CM

## 2024-06-01 DIAGNOSIS — Z79.4 TYPE 2 DIABETES MELLITUS WITH DIABETIC NEPHROPATHY, WITH LONG-TERM CURRENT USE OF INSULIN (HCC): ICD-10-CM

## 2024-06-01 DIAGNOSIS — I10 ESSENTIAL (PRIMARY) HYPERTENSION: ICD-10-CM

## 2024-06-01 DIAGNOSIS — E78.2 MIXED HYPERLIPIDEMIA: ICD-10-CM

## 2024-08-08 ENCOUNTER — TELEPHONE (OUTPATIENT)
Age: 64
End: 2024-08-08

## 2024-08-08 RX ORDER — ACYCLOVIR 400 MG/1
TABLET ORAL
Qty: 9 EACH | Refills: 1 | Status: SHIPPED | OUTPATIENT
Start: 2024-08-08

## 2024-08-15 RX ORDER — INSULIN PMP CART,AUT,G6/7,CNTR
EACH SUBCUTANEOUS
Qty: 30 EACH | Refills: 3 | Status: SHIPPED | OUTPATIENT
Start: 2024-08-15

## 2024-09-18 ENCOUNTER — TELEPHONE (OUTPATIENT)
Age: 64
End: 2024-09-18

## 2024-09-25 ENCOUNTER — TELEPHONE (OUTPATIENT)
Age: 64
End: 2024-09-25

## 2024-10-17 ENCOUNTER — OFFICE VISIT (OUTPATIENT)
Age: 64
End: 2024-10-17

## 2024-10-17 VITALS
WEIGHT: 135.6 LBS | HEIGHT: 62 IN | BODY MASS INDEX: 24.95 KG/M2 | HEART RATE: 57 BPM | DIASTOLIC BLOOD PRESSURE: 80 MMHG | SYSTOLIC BLOOD PRESSURE: 124 MMHG

## 2024-10-17 DIAGNOSIS — E11.21 TYPE 2 DIABETES MELLITUS WITH DIABETIC NEPHROPATHY, WITH LONG-TERM CURRENT USE OF INSULIN (HCC): Primary | ICD-10-CM

## 2024-10-17 DIAGNOSIS — E78.2 MIXED HYPERLIPIDEMIA: ICD-10-CM

## 2024-10-17 DIAGNOSIS — I10 ESSENTIAL (PRIMARY) HYPERTENSION: ICD-10-CM

## 2024-10-17 DIAGNOSIS — Z79.4 TYPE 2 DIABETES MELLITUS WITH DIABETIC NEPHROPATHY, WITH LONG-TERM CURRENT USE OF INSULIN (HCC): Primary | ICD-10-CM

## 2024-10-17 LAB — HBA1C MFR BLD: 9.2 %

## 2024-10-17 NOTE — PROGRESS NOTES
Chief Complaint   Patient presents with    Diabetes     Pcp and pharmacy verified     History of Present Illness: Corie Kee is a 64 y.o. female here for follow up of diabetes.    Her A1C at our last visit in February 2024 was 9.1%. I adjusted her pump settings based on her CGM data.    Pt has not been back to see me since February 2024.    Pt denies any recent illnesses, injuries or hospitalizations.    \"I am not bolusing as much as I should because I am afraid of getting low.\"    She is using a DexCom G6 with her OmniPod 5 to monitor her BG   I reviewed her last 30 days of CGM data.              Her A1C today was 9.2%.    She notes that she is still using the automated mode.    Her current Pump settings    Basal  MN-4AM: 0.2 units per hour  4AM-11AM 0.45 units per hour  11AM-1PM 0.75 units per hour  1PM-6PM 0.5 units per hour  6PM-MN 1.4 units per hour  Carb Ratio  1:15  Correction   1:50     She denies any recent steroid use.     She is changing her pod site every 3 days     Pt is waking around 8AM  - She will go for a run around 830AM,  and then have some fruit after her run in the morning, around 10-1030AM  - Her first meal is around 10AM.  - Around 11AM-2PM she will play tennis.  - She is not eating lunch, but will \"snack on fruit in the afternoon\".  - She will have dinner around 7PM, last night she had chicken and vegetable soup and white wine.      - She will have a bowl of NSA ice cream around 830PM      Pt notes that she runs, plays tennis and will swim to stay active and busy.      No history of vascular disease.        Pt has nephropathy, as well as CKD IIIb (eGFR 40's). She is followed by Dr. Marie Sequeira. She last saw him in January 2024. \"He said things were looking fine. I see him again next Monday.\"      No history of neuropathy.        Last eye exam was September 2024. She sees Dr. Rivera  She had cataract surgery in April 2021 \"things are going well\".   She is receiving injections for DM

## 2024-10-25 LAB — HBA1C MFR BLD HPLC: 9.3 %

## 2024-11-04 ENCOUNTER — TELEPHONE (OUTPATIENT)
Age: 64
End: 2024-11-04

## 2024-11-04 NOTE — TELEPHONE ENCOUNTER
Received a letter of denial from [facility]. Wrote an Urgent appeal letter asking for a reversal of the decision.

## 2024-11-07 ENCOUNTER — TELEPHONE (OUTPATIENT)
Age: 64
End: 2024-11-07

## 2024-11-07 NOTE — TELEPHONE ENCOUNTER
Pat from [employer] Appeals Department regarding the urgent request to approve Omnipods.  The pods have been approved from 11/06/2024-02/05/2025. A new PA will need to be initiated after 02/05/2025.  Left message on patient's VM (name confirmed).

## 2025-02-01 DIAGNOSIS — I10 ESSENTIAL (PRIMARY) HYPERTENSION: ICD-10-CM

## 2025-02-01 DIAGNOSIS — Z79.4 TYPE 2 DIABETES MELLITUS WITH DIABETIC NEPHROPATHY, WITH LONG-TERM CURRENT USE OF INSULIN (HCC): ICD-10-CM

## 2025-02-01 DIAGNOSIS — E78.2 MIXED HYPERLIPIDEMIA: ICD-10-CM

## 2025-02-01 DIAGNOSIS — E11.21 TYPE 2 DIABETES MELLITUS WITH DIABETIC NEPHROPATHY, WITH LONG-TERM CURRENT USE OF INSULIN (HCC): ICD-10-CM

## 2025-02-17 ENCOUNTER — OFFICE VISIT (OUTPATIENT)
Age: 65
End: 2025-02-17
Payer: COMMERCIAL

## 2025-02-17 VITALS
SYSTOLIC BLOOD PRESSURE: 100 MMHG | DIASTOLIC BLOOD PRESSURE: 64 MMHG | HEART RATE: 62 BPM | WEIGHT: 138 LBS | HEIGHT: 62 IN | BODY MASS INDEX: 25.4 KG/M2

## 2025-02-17 DIAGNOSIS — E78.2 MIXED HYPERLIPIDEMIA: ICD-10-CM

## 2025-02-17 DIAGNOSIS — E11.21 TYPE 2 DIABETES MELLITUS WITH DIABETIC NEPHROPATHY, WITH LONG-TERM CURRENT USE OF INSULIN (HCC): Primary | ICD-10-CM

## 2025-02-17 DIAGNOSIS — I10 ESSENTIAL (PRIMARY) HYPERTENSION: ICD-10-CM

## 2025-02-17 DIAGNOSIS — Z79.4 TYPE 2 DIABETES MELLITUS WITH DIABETIC NEPHROPATHY, WITH LONG-TERM CURRENT USE OF INSULIN (HCC): Primary | ICD-10-CM

## 2025-02-17 LAB — HBA1C MFR BLD: 9.2 %

## 2025-02-17 PROCEDURE — 3078F DIAST BP <80 MM HG: CPT | Performed by: INTERNAL MEDICINE

## 2025-02-17 PROCEDURE — 3046F HEMOGLOBIN A1C LEVEL >9.0%: CPT | Performed by: INTERNAL MEDICINE

## 2025-02-17 PROCEDURE — 95251 CONT GLUC MNTR ANALYSIS I&R: CPT | Performed by: INTERNAL MEDICINE

## 2025-02-17 PROCEDURE — 3074F SYST BP LT 130 MM HG: CPT | Performed by: INTERNAL MEDICINE

## 2025-02-17 PROCEDURE — 99214 OFFICE O/P EST MOD 30 MIN: CPT | Performed by: INTERNAL MEDICINE

## 2025-02-17 PROCEDURE — 83036 HEMOGLOBIN GLYCOSYLATED A1C: CPT | Performed by: INTERNAL MEDICINE

## 2025-02-17 RX ORDER — ACYCLOVIR 400 MG/1
TABLET ORAL
Qty: 9 EACH | Refills: 1 | Status: SHIPPED | OUTPATIENT
Start: 2025-02-17

## 2025-02-17 RX ORDER — ATORVASTATIN CALCIUM 10 MG/1
10 TABLET, FILM COATED ORAL DAILY
COMMUNITY

## 2025-02-17 RX ORDER — INSULIN ASPART INJECTION 100 [IU]/ML
INJECTION, SOLUTION SUBCUTANEOUS
Qty: 75 ML | Refills: 1 | Status: SHIPPED | OUTPATIENT
Start: 2025-02-17

## 2025-02-17 RX ORDER — INSULIN PMP CART,AUT,G6/7,CNTR
EACH SUBCUTANEOUS
Qty: 30 EACH | Refills: 1 | Status: SHIPPED | OUTPATIENT
Start: 2025-02-17

## 2025-02-17 NOTE — PROGRESS NOTES
had cataract surgery in April 2021 \"things are going well\".   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 She notes that \"they are helping\".    \"The Atorvastatin was making my muscles ache and she stopped taking it all together.\"    Current Outpatient Medications   Medication Sig    atorvastatin (LIPITOR) 10 MG tablet Take 1 tablet by mouth daily    Insulin Disposable Pump (OMNIPOD 5 G6 PODS, GEN 5,) MISC Change pod every 3 days.    Continuous Glucose Sensor (DEXCOM G7 SENSOR) MISC Change every 10 days    hydroCHLOROthiazide 12.5 MG capsule Take 1 capsule by mouth every morning    Insulin Aspart, w/Niacinamide, (FIASP) 100 UNIT/ML SOLN USES IN PUMP 80 units per day max    amLODIPine (NORVASC) 2.5 MG tablet 1 tablet daily    sodium zirconium cyclosilicate (LOKELMA) 5 g PACK oral suspension Take 1 packet by mouth every other day    Continuous Blood Gluc Transmit (DEXCOM G6 TRANSMITTER) MISC Change every 90 days    Continuous Blood Gluc Sensor (DEXCOM G6 SENSOR) MISC Change every 10 days (Patient not taking: Reported on 10/17/2024)    sodium bicarbonate 650 MG tablet Take 1 tablet by mouth 2 times daily (Patient not taking: Reported on 2/17/2025)     No current facility-administered medications for this visit.     No Known Allergies  Review of Systems:  As noted in HPI    Physical Examination:  Blood pressure 100/64, pulse 62, height 1.575 m (5' 2\"), weight 62.6 kg (138 lb).  General: pleasant, no distress, good eye contact   Neck: no carotid bruits  Cardiovascular: regular, normal rate, nl s1 and s2, no m/r/g, 2+ DP pulses   Respiratory: clear bilaterally  Integumentary: no edema, no foot ulcers,   Psychiatric: normal mood and affect    Diabetic foot exam:   Left Foot:   Visual Exam: normal   Pulse DP: 2+ (normal)   Filament test: normal sensation   Vibratory Sensation: normal  Right Foot:   Visual Exam: normal   Pulse DP: 2+ (normal)   Filament test: normal sensation   Vibratory Sensation:

## 2025-03-04 ENCOUNTER — TELEPHONE (OUTPATIENT)
Age: 65
End: 2025-03-04

## 2025-03-22 LAB — LDL CHOLESTEROL, EXTERNAL: 129

## 2025-04-29 RX ORDER — INSULIN PMP CART,AUT,G6/7,CNTR
EACH SUBCUTANEOUS
Qty: 10 EACH | Refills: 0 | Status: SHIPPED | OUTPATIENT
Start: 2025-04-29

## 2025-05-01 ENCOUNTER — TELEPHONE (OUTPATIENT)
Age: 65
End: 2025-05-01

## 2025-05-01 NOTE — TELEPHONE ENCOUNTER
Received a VM from Michela Madrid with [facility]. She stated that Dr. Gibbons wants to speak with  about this patient. She instructed to call her back to obtain the cell phone number for Dr. Gibbons.  Ms.Julius did not leave a phone number to contact her.  Left message on nurses line at [place] office to call back with her phone number.

## 2025-05-02 NOTE — TELEPHONE ENCOUNTER
Michela called 5/2 @ 9:38 am    She is returning jamshid's phone call to give her call back number.    Michela# [phone]

## 2025-05-05 NOTE — TELEPHONE ENCOUNTER
from [facility] would like to speak with  directly. His cell phone number is [phone]. Advised  will probably call around 4:30-5 pm. Michela said that she will let Dr. Gibbs know.

## 2025-05-06 ENCOUNTER — TELEPHONE (OUTPATIENT)
Age: 65
End: 2025-05-06

## 2025-05-07 RX ORDER — INSULIN PMP CART,AUT,G6/7,CNTR
EACH SUBCUTANEOUS
Qty: 10 EACH | Refills: 3 | OUTPATIENT
Start: 2025-05-07 | End: 2025-05-07 | Stop reason: SDUPTHER

## 2025-05-07 RX ORDER — INSULIN ASPART INJECTION 100 [IU]/ML
INJECTION, SOLUTION SUBCUTANEOUS
Qty: 80 ML | Refills: 1 | Status: SHIPPED | OUTPATIENT
Start: 2025-05-07 | End: 2025-05-07 | Stop reason: SDUPTHER

## 2025-05-07 RX ORDER — INSULIN PMP CART,AUT,G6/7,CNTR
EACH SUBCUTANEOUS
Qty: 10 EACH | Refills: 1 | Status: SHIPPED | OUTPATIENT
Start: 2025-05-07

## 2025-05-07 RX ORDER — ACYCLOVIR 400 MG/1
TABLET ORAL
Qty: 9 EACH | Refills: 1 | Status: SHIPPED | OUTPATIENT
Start: 2025-05-07

## 2025-05-07 RX ORDER — INSULIN ASPART INJECTION 100 [IU]/ML
INJECTION, SOLUTION SUBCUTANEOUS
Qty: 80 ML | Refills: 1 | Status: SHIPPED | OUTPATIENT
Start: 2025-05-07

## 2025-06-19 ENCOUNTER — OFFICE VISIT (OUTPATIENT)
Age: 65
End: 2025-06-19
Payer: MEDICARE

## 2025-06-19 VITALS
SYSTOLIC BLOOD PRESSURE: 114 MMHG | HEART RATE: 59 BPM | DIASTOLIC BLOOD PRESSURE: 60 MMHG | WEIGHT: 131.6 LBS | BODY MASS INDEX: 24.22 KG/M2 | HEIGHT: 62 IN

## 2025-06-19 DIAGNOSIS — I10 ESSENTIAL (PRIMARY) HYPERTENSION: ICD-10-CM

## 2025-06-19 DIAGNOSIS — Z79.4 TYPE 2 DIABETES MELLITUS WITH DIABETIC NEPHROPATHY, WITH LONG-TERM CURRENT USE OF INSULIN (HCC): Primary | ICD-10-CM

## 2025-06-19 DIAGNOSIS — E78.2 MIXED HYPERLIPIDEMIA: ICD-10-CM

## 2025-06-19 DIAGNOSIS — E11.21 TYPE 2 DIABETES MELLITUS WITH DIABETIC NEPHROPATHY, WITH LONG-TERM CURRENT USE OF INSULIN (HCC): Primary | ICD-10-CM

## 2025-06-19 LAB — HBA1C MFR BLD: 7.7 %

## 2025-06-19 PROCEDURE — PBSHW AMB POC HEMOGLOBIN A1C: Performed by: INTERNAL MEDICINE

## 2025-06-19 PROCEDURE — 1090F PRES/ABSN URINE INCON ASSESS: CPT | Performed by: INTERNAL MEDICINE

## 2025-06-19 PROCEDURE — 83036 HEMOGLOBIN GLYCOSYLATED A1C: CPT | Performed by: INTERNAL MEDICINE

## 2025-06-19 PROCEDURE — G8400 PT W/DXA NO RESULTS DOC: HCPCS | Performed by: INTERNAL MEDICINE

## 2025-06-19 PROCEDURE — 1123F ACP DISCUSS/DSCN MKR DOCD: CPT | Performed by: INTERNAL MEDICINE

## 2025-06-19 PROCEDURE — 3078F DIAST BP <80 MM HG: CPT | Performed by: INTERNAL MEDICINE

## 2025-06-19 PROCEDURE — 1036F TOBACCO NON-USER: CPT | Performed by: INTERNAL MEDICINE

## 2025-06-19 PROCEDURE — 2022F DILAT RTA XM EVC RTNOPTHY: CPT | Performed by: INTERNAL MEDICINE

## 2025-06-19 PROCEDURE — G8427 DOCREV CUR MEDS BY ELIG CLIN: HCPCS | Performed by: INTERNAL MEDICINE

## 2025-06-19 PROCEDURE — G2211 COMPLEX E/M VISIT ADD ON: HCPCS | Performed by: INTERNAL MEDICINE

## 2025-06-19 PROCEDURE — 99214 OFFICE O/P EST MOD 30 MIN: CPT | Performed by: INTERNAL MEDICINE

## 2025-06-19 PROCEDURE — 3017F COLORECTAL CA SCREEN DOC REV: CPT | Performed by: INTERNAL MEDICINE

## 2025-06-19 PROCEDURE — 95251 CONT GLUC MNTR ANALYSIS I&R: CPT | Performed by: INTERNAL MEDICINE

## 2025-06-19 PROCEDURE — 3046F HEMOGLOBIN A1C LEVEL >9.0%: CPT | Performed by: INTERNAL MEDICINE

## 2025-06-19 PROCEDURE — G8420 CALC BMI NORM PARAMETERS: HCPCS | Performed by: INTERNAL MEDICINE

## 2025-06-19 PROCEDURE — 3051F HG A1C>EQUAL 7.0%<8.0%: CPT | Performed by: INTERNAL MEDICINE

## 2025-06-19 PROCEDURE — 3074F SYST BP LT 130 MM HG: CPT | Performed by: INTERNAL MEDICINE

## 2025-06-19 RX ORDER — DAPAGLIFLOZIN 5 MG/1
5 TABLET, FILM COATED ORAL DAILY
COMMUNITY

## 2025-06-19 RX ORDER — ACYCLOVIR 400 MG/1
TABLET ORAL
Qty: 9 EACH | Refills: 1 | Status: SHIPPED | OUTPATIENT
Start: 2025-06-19

## 2025-06-19 RX ORDER — INSULIN ASPART INJECTION 100 [IU]/ML
INJECTION, SOLUTION SUBCUTANEOUS
Qty: 80 ML | Refills: 1 | Status: SHIPPED | OUTPATIENT
Start: 2025-06-19

## 2025-06-19 RX ORDER — INSULIN PMP CART,AUT,G6/7,CNTR
EACH SUBCUTANEOUS
Qty: 10 EACH | Refills: 1 | Status: SHIPPED | OUTPATIENT
Start: 2025-06-19

## 2025-06-19 NOTE — PROGRESS NOTES
Chief Complaint   Patient presents with    Diabetes     Pcp and pharmacy verified     History of Present Illness: Corie Kee is a 65 y.o. female here for follow up of diabetes.    Her A1C at our last visit in February 2025 was 9.2% she was underbolusing her meals and correting when her BG were high.  I adjusted her pump settings and instructed her not to underbolus her meals.    She noted that she had stopped taking her Atorvastatin. \"I don't feel like I can take it anymore.\"    Pt did not have her prelabs drawn for today's visit.    Pt denies any recent illnesses, injuries or hospitalizations.    Her current Pump settings    Basal  MN-4AM: 0.25 units per hour  4AM-11AM 0.6 units per hour  11AM-1PM 0.9 units per hour  1PM-6PM 0.70 units per hour  6PM-MN 1.45 units per hour  Carb Ratio  1:15  Correction   1:50       She is using a DexCom G7 with her OmniPod 5 to monitor her BG   I reviewed her last 30 days of CGM data.          Her A1C today was 7.7%.    She notes that she is still using the automated mode.  She insists she is bolusing to cover her meal.    She denies any recent steroid use.     She is changing her pod site every 3 days     Pt is waking around 730-8AM  - She will go for a run around 830AM,  and then have some fruit after her run in the morning, around 10-1030AM  - Her first meal is around 10AM.  - Around 11AM-2PM she will play tennis.  - She is not eating lunch, but will \"snack on fruit in the afternoon\".  - She will have dinner around 7PM, last night she had cod, green beans, side salad and wine.       - She will have a bowl of NSA ice cream around 830PM      Pt notes that she runs, plays tennis and will swim to stay active and busy.      No history of vascular disease.        Pt has nephropathy, as well as CKD IIIb (eGFR 40's). She is followed by Dr. Marie Sequeira. She last saw him in January 2024. \"He said things were looking fine. I see him again next Monday.\"      No history of neuropathy.

## 2025-06-25 ENCOUNTER — TELEPHONE (OUTPATIENT)
Age: 65
End: 2025-06-25

## 2025-08-26 DIAGNOSIS — Z79.4 TYPE 2 DIABETES MELLITUS WITH DIABETIC NEPHROPATHY, WITH LONG-TERM CURRENT USE OF INSULIN (HCC): Primary | ICD-10-CM

## 2025-08-26 DIAGNOSIS — E11.21 TYPE 2 DIABETES MELLITUS WITH DIABETIC NEPHROPATHY, WITH LONG-TERM CURRENT USE OF INSULIN (HCC): Primary | ICD-10-CM

## 2025-08-26 RX ORDER — ACYCLOVIR 400 MG/1
TABLET ORAL
Qty: 9 EACH | Refills: 0 | Status: SHIPPED | OUTPATIENT
Start: 2025-08-26

## 2025-08-28 ENCOUNTER — TELEPHONE (OUTPATIENT)
Age: 65
End: 2025-08-28